# Patient Record
Sex: FEMALE | Race: WHITE | NOT HISPANIC OR LATINO | URBAN - METROPOLITAN AREA
[De-identification: names, ages, dates, MRNs, and addresses within clinical notes are randomized per-mention and may not be internally consistent; named-entity substitution may affect disease eponyms.]

---

## 2023-03-08 ENCOUNTER — TELEPHONE (OUTPATIENT)
Dept: PRIMARY CARE | Facility: CLINIC | Age: 83
End: 2023-03-08
Payer: MEDICARE

## 2023-03-09 PROBLEM — M25.572 LEFT ANKLE PAIN: Status: ACTIVE | Noted: 2023-03-09

## 2023-03-09 PROBLEM — M25.50 ARTHRALGIA: Status: ACTIVE | Noted: 2023-03-09

## 2023-03-09 PROBLEM — M11.20 PSEUDOGOUT: Status: ACTIVE | Noted: 2023-03-09

## 2023-03-09 PROBLEM — R20.2 PARESTHESIA: Status: ACTIVE | Noted: 2023-03-09

## 2023-03-09 PROBLEM — M54.2 NECK PAIN: Status: ACTIVE | Noted: 2023-03-09

## 2023-03-09 PROBLEM — K64.4 EXTERNAL HEMORRHOID: Status: ACTIVE | Noted: 2023-03-09

## 2023-03-09 PROBLEM — U07.1 COVID-19: Status: ACTIVE | Noted: 2023-03-09

## 2023-03-09 PROBLEM — G62.9 NEUROPATHY: Status: ACTIVE | Noted: 2023-03-09

## 2023-03-09 PROBLEM — R15.9 STOOL INCONTINENCE: Status: ACTIVE | Noted: 2023-03-09

## 2023-03-09 PROBLEM — R09.89 CAROTID BRUIT: Status: ACTIVE | Noted: 2023-03-09

## 2023-03-09 PROBLEM — R51.9 HEADACHE: Status: ACTIVE | Noted: 2023-03-09

## 2023-03-09 PROBLEM — R61 NIGHT SWEATS: Status: ACTIVE | Noted: 2023-03-09

## 2023-03-09 PROBLEM — M79.604 RIGHT LEG PAIN: Status: ACTIVE | Noted: 2023-03-09

## 2023-03-09 PROBLEM — F41.9 ANXIETY: Status: ACTIVE | Noted: 2023-03-09

## 2023-03-09 PROBLEM — M54.50 LOW BACK PAIN: Status: ACTIVE | Noted: 2023-03-09

## 2023-03-09 PROBLEM — M79.603 PAIN IN ARM: Status: ACTIVE | Noted: 2023-03-09
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
## 2023-03-09 PROBLEM — M89.9 DISORDER OF BONE: Status: ACTIVE | Noted: 2023-03-09

## 2023-03-09 PROBLEM — R23.3 ABNORMAL BRUISING: Status: ACTIVE | Noted: 2023-03-09

## 2023-03-09 PROBLEM — H61.20 IMPACTED CERUMEN: Status: ACTIVE | Noted: 2023-03-09

## 2023-03-09 PROBLEM — I10 HYPERTENSION: Status: ACTIVE | Noted: 2023-03-09

## 2023-03-09 PROBLEM — M25.532 LEFT WRIST PAIN: Status: ACTIVE | Noted: 2023-03-09

## 2023-03-09 PROBLEM — K21.9 GERD (GASTROESOPHAGEAL REFLUX DISEASE): Status: ACTIVE | Noted: 2023-03-09

## 2023-03-09 PROBLEM — M79.671 RIGHT FOOT PAIN: Status: ACTIVE | Noted: 2023-03-09

## 2023-03-09 PROBLEM — E55.9 VITAMIN D DEFICIENCY: Status: ACTIVE | Noted: 2023-03-09

## 2023-03-09 PROBLEM — R73.02 GLUCOSE INTOLERANCE (IMPAIRED GLUCOSE TOLERANCE): Status: ACTIVE | Noted: 2023-03-09

## 2023-03-09 PROBLEM — M25.551 RIGHT HIP PAIN: Status: ACTIVE | Noted: 2023-03-09

## 2023-03-09 PROBLEM — M65.9 SYNOVITIS: Status: ACTIVE | Noted: 2023-03-09

## 2023-03-09 PROBLEM — B07.0 PLANTAR WART OF RIGHT FOOT: Status: ACTIVE | Noted: 2023-03-09

## 2023-03-09 PROBLEM — M65.90 SYNOVITIS: Status: ACTIVE | Noted: 2023-03-09

## 2023-03-09 PROBLEM — M43.00 SPONDYLOLYSIS: Status: ACTIVE | Noted: 2023-03-09

## 2023-03-09 RX ORDER — AMLODIPINE BESYLATE 5 MG/1
1 TABLET ORAL DAILY
COMMUNITY
Start: 2022-08-02 | End: 2023-06-07

## 2023-03-09 RX ORDER — ASPIRIN 81 MG/1
1 TABLET ORAL DAILY
COMMUNITY
Start: 2013-10-15 | End: 2023-08-21 | Stop reason: ALTCHOICE

## 2023-03-09 RX ORDER — CHOLECALCIFEROL (VITAMIN D3) 125 MCG
1 CAPSULE ORAL DAILY
COMMUNITY
Start: 2015-11-16

## 2023-03-09 RX ORDER — NAPROXEN SODIUM 220 MG/1
1 TABLET ORAL DAILY
COMMUNITY
Start: 2013-10-15

## 2023-03-09 RX ORDER — CALCIUM CARBONATE/VITAMIN D3 500-10/5ML
1 LIQUID (ML) ORAL DAILY
COMMUNITY
Start: 2018-05-02

## 2023-03-09 RX ORDER — ELECTROLYTES/DEXTROSE
1 SOLUTION, ORAL ORAL DAILY
COMMUNITY
Start: 2013-10-15 | End: 2023-10-17 | Stop reason: WASHOUT

## 2023-03-09 RX ORDER — DULOXETIN HYDROCHLORIDE 60 MG/1
1 CAPSULE, DELAYED RELEASE ORAL DAILY
COMMUNITY
Start: 2016-08-24 | End: 2023-04-03

## 2023-03-10 ENCOUNTER — TELEMEDICINE (OUTPATIENT)
Dept: PRIMARY CARE | Facility: CLINIC | Age: 83
End: 2023-03-10
Payer: MEDICARE

## 2023-03-10 DIAGNOSIS — U07.1 COVID-19: Primary | ICD-10-CM

## 2023-03-10 PROCEDURE — 99212 OFFICE O/P EST SF 10 MIN: CPT | Performed by: NURSE PRACTITIONER

## 2023-03-10 RX ORDER — NIRMATRELVIR AND RITONAVIR 300-100 MG
3 KIT ORAL 2 TIMES DAILY
Qty: 30 TABLET | Refills: 0 | Status: SHIPPED | OUTPATIENT
Start: 2023-03-10 | End: 2023-03-15

## 2023-03-10 ASSESSMENT — ENCOUNTER SYMPTOMS
DIARRHEA: 0
RHINORRHEA: 0
VOMITING: 0
PALPITATIONS: 0
CHILLS: 1
ABDOMINAL DISTENTION: 0
NAUSEA: 0
CHEST TIGHTNESS: 0
CONSTIPATION: 0
COUGH: 0
FATIGUE: 1
SORE THROAT: 0
SHORTNESS OF BREATH: 0
FEVER: 0
SINUS PRESSURE: 0

## 2023-03-10 NOTE — PROGRESS NOTES
Subjective   Patient ID: Kandice Lobo is a 83 y.o. female who presents for Covid-19 Home Monitoring Visit (Positive for covid yesterday, headache, chills, sweats. Symptoms developed Tuesday. ).    HPI   83 year old female presents with positive COVID test 2 days ago. Her symptoms include headache, chills, and sweating. Symptoms started 3 days ago.   Patient reports this is her third time having COVID-- last time was 1 year ago.     She denies fever, nausea, vomiting, diarrhea, chest pain, or SOB.     Review of Systems   Constitutional:  Positive for chills and fatigue. Negative for fever.   HENT:  Negative for ear pain, postnasal drip, rhinorrhea, sinus pressure and sore throat.    Respiratory:  Negative for cough, chest tightness and shortness of breath.    Cardiovascular:  Negative for chest pain and palpitations.   Gastrointestinal:  Negative for abdominal distention, constipation, diarrhea, nausea and vomiting.       Objective   There were no vitals taken for this visit.  BSA There is no height or weight on file to calculate BSA.      Physical Exam  [facility] Encounter on 10/03/2022   Component Date Value Ref Range Status    TSH 10/03/2022 2.42  0.44 - 3.98 mIU/L Final    Comment:  TSH testing is performed using different testing    methodology at [facility] than at other    [facility] hospitals. Direct result comparisons should    only be made within the same method.      Glucose 10/03/2022 100 (H)  74 - 99 mg/dL Final    Sodium 10/03/2022 140  136 - 145 mmol/L Final    Potassium 10/03/2022 4.5  3.5 - 5.3 mmol/L Final    Chloride 10/03/2022 106  98 - 107 mmol/L Final    Bicarbonate 10/03/2022 27  21 - 32 mmol/L Final    Anion Gap 10/03/2022 12  10 - 20 mmol/L Final    Urea Nitrogen 10/03/2022 22  6 - 23 mg/dL Final    Creatinine 10/03/2022 0.75  0.50 - 1.05 mg/dL Final    GFR Female 10/03/2022 79  >90 mL/min/1.73m2 Final    Comment:  CALCULATIONS OF ESTIMATED GFR ARE PERFORMED   USING THE 2021  CKD-EPI STUDY REFIT EQUATION   WITHOUT THE RACE VARIABLE FOR THE IDMS-TRACEABLE   CREATININE METHODS.    https://jasn.asnjournals.org/content/early/2021/09/22/ASN.8056307753      Calcium 10/03/2022 9.7  8.6 - 10.6 mg/dL Final    Albumin 10/03/2022 4.5  3.4 - 5.0 g/dL Final    Alkaline Phosphatase 10/03/2022 55  33 - 136 U/L Final    Total Protein 10/03/2022 6.9  6.4 - 8.2 g/dL Final    AST 10/03/2022 15  9 - 39 U/L Final    Total Bilirubin 10/03/2022 0.8  0.0 - 1.2 mg/dL Final    ALT (SGPT) 10/03/2022 9  7 - 45 U/L Final    Comment:  Patients treated with Sulfasalazine may generate    falsely decreased results for ALT.      Vitamin D, 25-Hydroxy 10/03/2022 80  ng/mL Final    Comment: .  DEFICIENCY:         < 20   NG/ML  INSUFFICIENCY:      20-29  NG/ML  SUFFICIENCY:         NG/ML    THIS ASSAY ACCURATELY QUANTIFIES THE SUM OF  VITAMIN D3, 25-HYDROXY AND VIT D2,25-HYDROXY.      WBC 10/03/2022 9.2  4.4 - 11.3 x10E9/L Final    nRBC 10/03/2022 0.0  0.0 - 0.0 /100 WBC Final    RBC 10/03/2022 4.12  4.00 - 5.20 x10E12/L Final    Hemoglobin 10/03/2022 13.7  12.0 - 16.0 g/dL Final    Hematocrit 10/03/2022 40.7  36.0 - 46.0 % Final    MCV 10/03/2022 99  80 - 100 fL Final    MCHC 10/03/2022 33.7  32.0 - 36.0 g/dL Final    Platelets 10/03/2022 353  150 - 450 x10E9/L Final    RDW 10/03/2022 12.8  11.5 - 14.5 % Final    Neutrophils % 10/03/2022 67.7  40.0 - 80.0 % Final    Immature Granulocytes %, Automated 10/03/2022 0.5  0.0 - 0.9 % Final    Comment:  Immature Granulocyte Count (IG) includes promyelocytes,    myelocytes and metamyelocytes but does not include bands.   Percent differential counts (%) should be interpreted in the   context of the absolute cell counts (cells/L).      Lymphocytes % 10/03/2022 22.6  13.0 - 44.0 % Final    Monocytes % 10/03/2022 7.3  2.0 - 10.0 % Final    Eosinophils % 10/03/2022 1.6  0.0 - 6.0 % Final    Basophils % 10/03/2022 0.3  0.0 - 2.0 % Final    Neutrophils Absolute 10/03/2022 6.22 (H)   1.60 - 5.50 x10E9/L Final    Lymphocytes Absolute 10/03/2022 2.08  0.80 - 3.00 x10E9/L Final    Monocytes Absolute 10/03/2022 0.67  0.05 - 0.80 x10E9/L Final    Eosinophils Absolute 10/03/2022 0.15  0.00 - 0.40 x10E9/L Final    Basophils Absolute 10/03/2022 0.03  0.00 - 0.10 x10E9/L Final    Cholesterol 10/03/2022 130  0 - 199 mg/dL Final    Comment: .      AGE      DESIRABLE   BORDERLINE HIGH   HIGH     0-19 Y     0 - 169       170 - 199     >/= 200    20-24 Y     0 - 189       190 - 224     >/= 225         >24 Y     0 - 199       200 - 239     >/= 240   **All ranges are based on fasting samples. Specific   therapeutic targets will vary based on patient-specific   cardiac risk.  .   Pediatric guidelines reference:Pediatrics 2011, 128(S5).   Adult guidelines reference: NCEP ATPIII Guidelines,     [place] 2001, 258:2486-97  .   Venipuncture immediately after or during the    administration of Metamizole may lead to falsely   low results. Testing should be performed immediately   prior to Metamizole dosing.      HDL 10/03/2022 69.5  mg/dL Final    Comment: .      AGE      VERY LOW   LOW     NORMAL    HIGH       0-19 Y       < 35   < 40     40-45     ----    20-24 Y       ----   < 40       >45     ----      >24 Y       ----   < 40     40-60      >60  .      Cholesterol/HDL Ratio 10/03/2022 1.9   Final    Comment: REF VALUES  DESIRABLE  < 3.4  HIGH RISK  > 5.0      LDL 10/03/2022 45  0 - 99 mg/dL Final    Comment: .                           NEAR      BORD      AGE      DESIRABLE  OPTIMAL    HIGH     HIGH     VERY HIGH     0-19 Y     0 - 109     ---    110-129   >/= 130     ----    20-24 Y     0 - 119     ---    120-159   >/= 160     ----      >24 Y     0 -  99   100-129  130-159   160-189     >/=190  .      VLDL 10/03/2022 16  0 - 40 mg/dL Final    Triglycerides 10/03/2022 80  0 - 149 mg/dL Final    Comment: .      AGE      DESIRABLE   BORDERLINE HIGH   HIGH     VERY HIGH   0 D-90 D    19 - 174         ----         ----         ----  91 D- 9 Y     0 -  74        75 -  99     >/= 100      ----    10-19 Y     0 -  89        90 - 129     >/= 130      ----    20-24 Y     0 - 114       115 - 149     >/= 150      ----         >24 Y     0 - 149       150 - 199    200- 499    >/= 500  .   Venipuncture immediately after or during the    administration of Metamizole may lead to falsely   low results. Testing should be performed immediately   prior to Metamizole dosing.       Current Outpatient Medications on File Prior to Visit   Medication Sig Dispense Refill    amLODIPine (Norvasc) 5 mg tablet Take 1 tablet (5 mg) by mouth once daily.      aspirin 81 mg EC tablet Take 1 tablet (81 mg) by mouth once daily.      biotin 5 mg capsule Take 1 capsule (5 mg) by mouth once daily.      cholecalciferol (Vitamin D-3) 125 MCG (5000 UT) capsule Take 1 capsule (125 mcg) by mouth once daily.      DULoxetine (Cymbalta) 60 mg DR capsule Take 1 capsule (60 mg) by mouth once daily.      magnesium oxide 400 mg magnesium capsule Take 1 capsule (400 mg) by mouth once daily.      omega 3-dha-epa-fish oil 1,200 (144-216) mg capsule Take 1 capsule (1,200 mg) by mouth once daily.       No current facility-administered medications on file prior to visit.     No images are attached to the encounter.            Assessment/Plan   Problem List Items Addressed This Visit          Infectious/Inflammatory    COVID-19 - Primary     - Advised patient to quarantine for 5 days from symptom onset  - Advised patient to wear a mask for 10 days   - Paxlovid sent to pharmacy  - Patient to report to ED if develops chest pain, SOB, or fevers unrelieved by tyelnol/advil

## 2023-03-10 NOTE — ASSESSMENT & PLAN NOTE
- Advised patient to quarantine for 5 days from symptom onset  - Advised patient to wear a mask for 10 days   - Paxlovid sent to pharmacy  - Patient to report to ED if develops chest pain, SOB, or fevers unrelieved by tyelnol/advil

## 2023-06-07 DIAGNOSIS — I10 PRIMARY HYPERTENSION: ICD-10-CM

## 2023-06-07 RX ORDER — AMLODIPINE BESYLATE 5 MG/1
TABLET ORAL
Qty: 90 TABLET | Refills: 0 | Status: SHIPPED | OUTPATIENT
Start: 2023-06-07 | End: 2023-10-04

## 2023-07-05 ENCOUNTER — TELEPHONE (OUTPATIENT)
Dept: PRIMARY CARE | Facility: CLINIC | Age: 83
End: 2023-07-05
Payer: MEDICARE

## 2023-07-05 DIAGNOSIS — E55.9 VITAMIN D DEFICIENCY: ICD-10-CM

## 2023-07-05 DIAGNOSIS — I10 HYPERTENSION, UNSPECIFIED TYPE: ICD-10-CM

## 2023-07-21 ENCOUNTER — OFFICE VISIT (OUTPATIENT)
Dept: PRIMARY CARE | Facility: CLINIC | Age: 83
End: 2023-07-21
Payer: MEDICARE

## 2023-07-21 VITALS
BODY MASS INDEX: 22.67 KG/M2 | OXYGEN SATURATION: 95 % | DIASTOLIC BLOOD PRESSURE: 72 MMHG | HEART RATE: 80 BPM | SYSTOLIC BLOOD PRESSURE: 127 MMHG | WEIGHT: 118 LBS

## 2023-07-21 DIAGNOSIS — R23.3 ABNORMAL BRUISING: ICD-10-CM

## 2023-07-21 DIAGNOSIS — R53.83 OTHER FATIGUE: Primary | ICD-10-CM

## 2023-07-21 DIAGNOSIS — M25.562 ACUTE PAIN OF LEFT KNEE: ICD-10-CM

## 2023-07-21 PROBLEM — M25.551 RIGHT HIP PAIN: Status: RESOLVED | Noted: 2023-03-09 | Resolved: 2023-07-21

## 2023-07-21 PROBLEM — M25.532 LEFT WRIST PAIN: Status: RESOLVED | Noted: 2023-03-09 | Resolved: 2023-07-21

## 2023-07-21 PROBLEM — M25.572 LEFT ANKLE PAIN: Status: RESOLVED | Noted: 2023-03-09 | Resolved: 2023-07-21

## 2023-07-21 PROBLEM — M11.20 CHONDROCALCINOSIS: Status: ACTIVE | Noted: 2023-07-21

## 2023-07-21 PROBLEM — M54.2 NECK PAIN: Status: RESOLVED | Noted: 2023-03-09 | Resolved: 2023-07-21

## 2023-07-21 PROBLEM — M79.604 RIGHT LEG PAIN: Status: RESOLVED | Noted: 2023-03-09 | Resolved: 2023-07-21

## 2023-07-21 PROBLEM — M79.671 RIGHT FOOT PAIN: Status: RESOLVED | Noted: 2023-03-09 | Resolved: 2023-07-21

## 2023-07-21 PROBLEM — M54.50 LOW BACK PAIN: Status: RESOLVED | Noted: 2023-03-09 | Resolved: 2023-07-21

## 2023-07-21 PROBLEM — H61.20 IMPACTED CERUMEN: Status: RESOLVED | Noted: 2023-03-09 | Resolved: 2023-07-21

## 2023-07-21 PROBLEM — R76.8 POSITIVE ANTINUCLEAR ANTIBODY: Status: ACTIVE | Noted: 2023-07-21

## 2023-07-21 PROBLEM — M16.10 PRIMARY LOCALIZED OSTEOARTHRITIS OF PELVIC REGION AND THIGH: Status: ACTIVE | Noted: 2023-07-21

## 2023-07-21 PROBLEM — M17.0 ARTHRITIS OF BOTH KNEES: Status: ACTIVE | Noted: 2023-07-21

## 2023-07-21 PROCEDURE — 3074F SYST BP LT 130 MM HG: CPT | Performed by: NURSE PRACTITIONER

## 2023-07-21 PROCEDURE — 99213 OFFICE O/P EST LOW 20 MIN: CPT | Performed by: NURSE PRACTITIONER

## 2023-07-21 PROCEDURE — 3078F DIAST BP <80 MM HG: CPT | Performed by: NURSE PRACTITIONER

## 2023-07-21 PROCEDURE — 1036F TOBACCO NON-USER: CPT | Performed by: NURSE PRACTITIONER

## 2023-07-21 PROCEDURE — 1159F MED LIST DOCD IN RCRD: CPT | Performed by: NURSE PRACTITIONER

## 2023-07-21 PROCEDURE — 1160F RVW MEDS BY RX/DR IN RCRD: CPT | Performed by: NURSE PRACTITIONER

## 2023-07-21 ASSESSMENT — ENCOUNTER SYMPTOMS
FEVER: 0
CHILLS: 0
ABDOMINAL PAIN: 0
NAUSEA: 0
DYSURIA: 0
BRUISES/BLEEDS EASILY: 1
VOMITING: 0
HEMATURIA: 0
COUGH: 0
SHORTNESS OF BREATH: 0
DIARRHEA: 0

## 2023-07-21 NOTE — ASSESSMENT & PLAN NOTE
Follows with ortho at [facility] Dr. Mynor Wlaton  Recommend patient schedule follow up apt  According to patient last steroid injection was 3 months ago.,

## 2023-07-21 NOTE — PROGRESS NOTES
Subjective   Chief Complaint: No energy and Bleeding/Bruising (Bruising easily).    HPI   Kandice Lobo is a 83 y.o. female who presents for No energy and Bleeding/Bruising (Bruising easily).    Patient presents with multiple complaints.    She reports that she has been noticed increased bruising lately. She has a history of frequent bruising but feels it has become more lately.   She reports feeling less energy overall as well lately.   She does take a daily aspirin. She reports she takes this due to her age.   Does not have a history of blood clots or CVA.     She also reports left knee and leg pain, She follows with Dr. Mynor bagley at [facility] for letf knee bakers cysts and had a steroid injection 3 months ago.   Patient reports her pain has been keeping her up at night (which may be the cause of her increased fatigue). She does not have a follow up apt scheduled with her orthopedic specialist and was advised to schedule.      Patient denies fever, chills, nausea, vomiting, diarrhea, chest pain, heart palpations, or shortness of breath.         Review of Systems   Constitutional:  Negative for chills and fever.   Respiratory:  Negative for cough and shortness of breath.    Cardiovascular:  Negative for chest pain.   Gastrointestinal:  Negative for abdominal pain, diarrhea, nausea and vomiting.   Genitourinary:  Negative for decreased urine volume, dysuria and hematuria.   Musculoskeletal:         Left knee and leg pain   No swelling  No warmth, no redness    Hematological:  Bruises/bleeds easily.       Objective   /72   Pulse 80   Wt 53.5 kg (118 lb)   SpO2 95%   BMI 22.67 kg/m²   BSA Body surface area is 1.51 meters squared.      Physical Exam  Constitutional:       Appearance: Normal appearance.   Cardiovascular:      Rate and Rhythm: Normal rate and regular rhythm.   Pulmonary:      Effort: Pulmonary effort is normal.      Breath sounds: Normal breath sounds.   Abdominal:      General:  Abdomen is flat.      Palpations: Abdomen is soft.   Skin:     Findings: Bruising present.   Neurological:      Mental Status: She is alert.       [facility] Encounter on 10/03/2022   Component Date Value Ref Range Status    TSH 10/03/2022 2.42  0.44 - 3.98 mIU/L Final    Comment:  TSH testing is performed using different testing    methodology at [facility] than at other    [facility]. Direct result comparisons should    only be made within the same method.      Glucose 10/03/2022 100 (H)  74 - 99 mg/dL Final    Sodium 10/03/2022 140  136 - 145 mmol/L Final    Potassium 10/03/2022 4.5  3.5 - 5.3 mmol/L Final    Chloride 10/03/2022 106  98 - 107 mmol/L Final    Bicarbonate 10/03/2022 27  21 - 32 mmol/L Final    Anion Gap 10/03/2022 12  10 - 20 mmol/L Final    Urea Nitrogen 10/03/2022 22  6 - 23 mg/dL Final    Creatinine 10/03/2022 0.75  0.50 - 1.05 mg/dL Final    GFR Female 10/03/2022 79  >90 mL/min/1.73m2 Final    Comment:  CALCULATIONS OF ESTIMATED GFR ARE PERFORMED   USING THE 2021 CKD-EPI STUDY REFIT EQUATION   WITHOUT THE RACE VARIABLE FOR THE IDMS-TRACEABLE   CREATININE METHODS.    https://jasn.asnjournals.org/content/early/2021/09/22/ASN.1267155627      Calcium 10/03/2022 9.7  8.6 - 10.6 mg/dL Final    Albumin 10/03/2022 4.5  3.4 - 5.0 g/dL Final    Alkaline Phosphatase 10/03/2022 55  33 - 136 U/L Final    Total Protein 10/03/2022 6.9  6.4 - 8.2 g/dL Final    AST 10/03/2022 15  9 - 39 U/L Final    Total Bilirubin 10/03/2022 0.8  0.0 - 1.2 mg/dL Final    ALT (SGPT) 10/03/2022 9  7 - 45 U/L Final    Comment:  Patients treated with Sulfasalazine may generate    falsely decreased results for ALT.      Vitamin D, 25-Hydroxy 10/03/2022 80  ng/mL Final    Comment: .  DEFICIENCY:         < 20   NG/ML  INSUFFICIENCY:      20-29  NG/ML  SUFFICIENCY:         NG/ML    THIS ASSAY ACCURATELY QUANTIFIES THE SUM OF  VITAMIN D3, 25-HYDROXY AND VIT D2,25-HYDROXY.      WBC 10/03/2022 9.2  4.4 - 11.3  x10E9/L Final    nRBC 10/03/2022 0.0  0.0 - 0.0 /100 WBC Final    RBC 10/03/2022 4.12  4.00 - 5.20 x10E12/L Final    Hemoglobin 10/03/2022 13.7  12.0 - 16.0 g/dL Final    Hematocrit 10/03/2022 40.7  36.0 - 46.0 % Final    MCV 10/03/2022 99  80 - 100 fL Final    MCHC 10/03/2022 33.7  32.0 - 36.0 g/dL Final    Platelets 10/03/2022 353  150 - 450 x10E9/L Final    RDW 10/03/2022 12.8  11.5 - 14.5 % Final    Neutrophils % 10/03/2022 67.7  40.0 - 80.0 % Final    Immature Granulocytes %, Automated 10/03/2022 0.5  0.0 - 0.9 % Final    Comment:  Immature Granulocyte Count (IG) includes promyelocytes,    myelocytes and metamyelocytes but does not include bands.   Percent differential counts (%) should be interpreted in the   context of the absolute cell counts (cells/L).      Lymphocytes % 10/03/2022 22.6  13.0 - 44.0 % Final    Monocytes % 10/03/2022 7.3  2.0 - 10.0 % Final    Eosinophils % 10/03/2022 1.6  0.0 - 6.0 % Final    Basophils % 10/03/2022 0.3  0.0 - 2.0 % Final    Neutrophils Absolute 10/03/2022 6.22 (H)  1.60 - 5.50 x10E9/L Final    Lymphocytes Absolute 10/03/2022 2.08  0.80 - 3.00 x10E9/L Final    Monocytes Absolute 10/03/2022 0.67  0.05 - 0.80 x10E9/L Final    Eosinophils Absolute 10/03/2022 0.15  0.00 - 0.40 x10E9/L Final    Basophils Absolute 10/03/2022 0.03  0.00 - 0.10 x10E9/L Final    Cholesterol 10/03/2022 130  0 - 199 mg/dL Final    Comment: .      AGE      DESIRABLE   BORDERLINE HIGH   HIGH     0-19 Y     0 - 169       170 - 199     >/= 200    20-24 Y     0 - 189       190 - 224     >/= 225         >24 Y     0 - 199       200 - 239     >/= 240   **All ranges are based on fasting samples. Specific   therapeutic targets will vary based on patient-specific   cardiac risk.  .   Pediatric guidelines reference:Pediatrics 2011, 128(S5).   Adult guidelines reference: NCEP ATPIII Guidelines,     [place] 2001, 258:2486-97  .   Venipuncture immediately after or during the    administration of Metamizole may lead to  falsely   low results. Testing should be performed immediately   prior to Metamizole dosing.      HDL 10/03/2022 69.5  mg/dL Final    Comment: .      AGE      VERY LOW   LOW     NORMAL    HIGH       0-19 Y       < 35   < 40     40-45     ----    20-24 Y       ----   < 40       >45     ----      >24 Y       ----   < 40     40-60      >60  .      Cholesterol/HDL Ratio 10/03/2022 1.9   Final    Comment: REF VALUES  DESIRABLE  < 3.4  HIGH RISK  > 5.0      LDL 10/03/2022 45  0 - 99 mg/dL Final    Comment: .                           NEAR      BORD      AGE      DESIRABLE  OPTIMAL    HIGH     HIGH     VERY HIGH     0-19 Y     0 - 109     ---    110-129   >/= 130     ----    20-24 Y     0 - 119     ---    120-159   >/= 160     ----      >24 Y     0 -  99   100-129  130-159   160-189     >/=190  .      VLDL 10/03/2022 16  0 - 40 mg/dL Final    Triglycerides 10/03/2022 80  0 - 149 mg/dL Final    Comment: .      AGE      DESIRABLE   BORDERLINE HIGH   HIGH     VERY HIGH   0 D-90 D    19 - 174         ----         ----        ----  91 D- 9 Y     0 -  74        75 -  99     >/= 100      ----    10-19 Y     0 -  89        90 - 129     >/= 130      ----    20-24 Y     0 - 114       115 - 149     >/= 150      ----         >24 Y     0 - 149       150 - 199    200- 499    >/= 500  .   Venipuncture immediately after or during the    administration of Metamizole may lead to falsely   low results. Testing should be performed immediately   prior to Metamizole dosing.       Current Outpatient Medications on File Prior to Visit   Medication Sig Dispense Refill    amLODIPine (Norvasc) 5 mg tablet TAKE ONE TABLET BY MOUTH ONCE DAILY. 90 tablet 0    aspirin 81 mg EC tablet Take 1 tablet (81 mg) by mouth once daily.      cholecalciferol (Vitamin D-3) 125 MCG (5000 UT) capsule Take 1 capsule (125 mcg) by mouth once daily.      DULoxetine (Cymbalta) 60 mg DR capsule TAKE ONE CAPSULE BY MOUTH ONCE DAILY. 90 capsule 0    magnesium oxide 400 mg  magnesium capsule Take 1 capsule (400 mg) by mouth once daily.      omega 3-dha-epa-fish oil 1,200 (144-216) mg capsule Take 1 capsule (1,200 mg) by mouth once daily.      biotin 5 mg capsule Take 1 capsule (5 mg) by mouth once daily.       No current facility-administered medications on file prior to visit.     No images are attached to the encounter.            Assessment/Plan   Problem List Items Addressed This Visit       Other fatigue - Primary     BW ordered           Relevant Orders    Vitamin B12    Acute pain of left knee     Follows with ortho at [facility] Dr. Mynor Walton  Recommend patient schedule follow up apt  According to patient last steroid injection was 3 months ago.,          Abnormal bruising     BW ordered

## 2023-07-27 ENCOUNTER — LAB (OUTPATIENT)
Dept: LAB | Facility: LAB | Age: 83
End: 2023-07-27
Payer: MEDICARE

## 2023-07-27 DIAGNOSIS — E55.9 VITAMIN D DEFICIENCY: ICD-10-CM

## 2023-07-27 DIAGNOSIS — R53.83 OTHER FATIGUE: ICD-10-CM

## 2023-07-27 DIAGNOSIS — I10 HYPERTENSION, UNSPECIFIED TYPE: ICD-10-CM

## 2023-07-27 LAB
BASOPHILS (10*3/UL) IN BLOOD BY AUTOMATED COUNT: 0.02 X10E9/L (ref 0–0.1)
BASOPHILS/100 LEUKOCYTES IN BLOOD BY AUTOMATED COUNT: 0.4 % (ref 0–2)
EOSINOPHILS (10*3/UL) IN BLOOD BY AUTOMATED COUNT: 0.09 X10E9/L (ref 0–0.4)
EOSINOPHILS/100 LEUKOCYTES IN BLOOD BY AUTOMATED COUNT: 1.6 % (ref 0–6)
ERYTHROCYTE DISTRIBUTION WIDTH (RATIO) BY AUTOMATED COUNT: 12.1 % (ref 11.5–14.5)
ERYTHROCYTE MEAN CORPUSCULAR HEMOGLOBIN CONCENTRATION (G/DL) BY AUTOMATED: 32.5 G/DL (ref 32–36)
ERYTHROCYTE MEAN CORPUSCULAR VOLUME (FL) BY AUTOMATED COUNT: 102 FL (ref 80–100)
ERYTHROCYTES (10*6/UL) IN BLOOD BY AUTOMATED COUNT: 4.1 X10E12/L (ref 4–5.2)
HEMATOCRIT (%) IN BLOOD BY AUTOMATED COUNT: 41.9 % (ref 36–46)
HEMOGLOBIN (G/DL) IN BLOOD: 13.6 G/DL (ref 12–16)
IMMATURE GRANULOCYTES/100 LEUKOCYTES IN BLOOD BY AUTOMATED COUNT: 0.2 % (ref 0–0.9)
LEUKOCYTES (10*3/UL) IN BLOOD BY AUTOMATED COUNT: 5.7 X10E9/L (ref 4.4–11.3)
LYMPHOCYTES (10*3/UL) IN BLOOD BY AUTOMATED COUNT: 1.73 X10E9/L (ref 0.8–3)
LYMPHOCYTES/100 LEUKOCYTES IN BLOOD BY AUTOMATED COUNT: 30.5 % (ref 13–44)
MONOCYTES (10*3/UL) IN BLOOD BY AUTOMATED COUNT: 0.58 X10E9/L (ref 0.05–0.8)
MONOCYTES/100 LEUKOCYTES IN BLOOD BY AUTOMATED COUNT: 10.2 % (ref 2–10)
NEUTROPHILS (10*3/UL) IN BLOOD BY AUTOMATED COUNT: 3.25 X10E9/L (ref 1.6–5.5)
NEUTROPHILS/100 LEUKOCYTES IN BLOOD BY AUTOMATED COUNT: 57.1 % (ref 40–80)
NRBC (PER 100 WBCS) BY AUTOMATED COUNT: 0 /100 WBC (ref 0–0)
PLATELETS (10*3/UL) IN BLOOD AUTOMATED COUNT: 335 X10E9/L (ref 150–450)

## 2023-07-27 PROCEDURE — 82306 VITAMIN D 25 HYDROXY: CPT

## 2023-07-27 PROCEDURE — 83036 HEMOGLOBIN GLYCOSYLATED A1C: CPT

## 2023-07-27 PROCEDURE — 85025 COMPLETE CBC W/AUTO DIFF WBC: CPT

## 2023-07-27 PROCEDURE — 84443 ASSAY THYROID STIM HORMONE: CPT

## 2023-07-27 PROCEDURE — 80061 LIPID PANEL: CPT

## 2023-07-27 PROCEDURE — 80053 COMPREHEN METABOLIC PANEL: CPT

## 2023-07-27 PROCEDURE — 36415 COLL VENOUS BLD VENIPUNCTURE: CPT

## 2023-07-27 PROCEDURE — 82607 VITAMIN B-12: CPT

## 2023-07-28 LAB
ALANINE AMINOTRANSFERASE (SGPT) (U/L) IN SER/PLAS: 6 U/L (ref 7–45)
ALBUMIN (G/DL) IN SER/PLAS: 4.7 G/DL (ref 3.4–5)
ALKALINE PHOSPHATASE (U/L) IN SER/PLAS: 48 U/L (ref 33–136)
ANION GAP IN SER/PLAS: 17 MMOL/L (ref 10–20)
ASPARTATE AMINOTRANSFERASE (SGOT) (U/L) IN SER/PLAS: 14 U/L (ref 9–39)
BILIRUBIN TOTAL (MG/DL) IN SER/PLAS: 0.5 MG/DL (ref 0–1.2)
CALCIDIOL (25 OH VITAMIN D3) (NG/ML) IN SER/PLAS: 87 NG/ML
CALCIUM (MG/DL) IN SER/PLAS: 9.8 MG/DL (ref 8.6–10.6)
CARBON DIOXIDE, TOTAL (MMOL/L) IN SER/PLAS: 22 MMOL/L (ref 21–32)
CHLORIDE (MMOL/L) IN SER/PLAS: 106 MMOL/L (ref 98–107)
CHOLESTEROL (MG/DL) IN SER/PLAS: 169 MG/DL (ref 0–199)
CHOLESTEROL IN HDL (MG/DL) IN SER/PLAS: 60.8 MG/DL
CHOLESTEROL/HDL RATIO: 2.8
COBALAMIN (VITAMIN B12) (PG/ML) IN SER/PLAS: 226 PG/ML (ref 211–911)
CREATININE (MG/DL) IN SER/PLAS: 0.95 MG/DL (ref 0.5–1.05)
ESTIMATED AVERAGE GLUCOSE FOR HBA1C: 123 MG/DL
GFR FEMALE: 59 ML/MIN/1.73M2
GLUCOSE (MG/DL) IN SER/PLAS: 132 MG/DL (ref 74–99)
HEMOGLOBIN A1C/HEMOGLOBIN TOTAL IN BLOOD: 5.9 %
LDL: 94 MG/DL (ref 0–99)
POTASSIUM (MMOL/L) IN SER/PLAS: 4.3 MMOL/L (ref 3.5–5.3)
PROTEIN TOTAL: 7.1 G/DL (ref 6.4–8.2)
SODIUM (MMOL/L) IN SER/PLAS: 141 MMOL/L (ref 136–145)
THYROTROPIN (MIU/L) IN SER/PLAS BY DETECTION LIMIT <= 0.05 MIU/L: 2.41 MIU/L (ref 0.44–3.98)
TRIGLYCERIDE (MG/DL) IN SER/PLAS: 69 MG/DL (ref 0–149)
UREA NITROGEN (MG/DL) IN SER/PLAS: 35 MG/DL (ref 6–23)
VLDL: 14 MG/DL (ref 0–40)

## 2023-08-06 DIAGNOSIS — G62.9 NEUROPATHY: ICD-10-CM

## 2023-08-07 RX ORDER — DULOXETIN HYDROCHLORIDE 60 MG/1
CAPSULE, DELAYED RELEASE ORAL
Qty: 90 CAPSULE | Refills: 0 | Status: SHIPPED | OUTPATIENT
Start: 2023-08-07 | End: 2023-12-02

## 2023-08-18 ENCOUNTER — TELEPHONE (OUTPATIENT)
Dept: PRIMARY CARE | Facility: CLINIC | Age: 83
End: 2023-08-18
Payer: MEDICARE

## 2023-08-21 ENCOUNTER — LAB (OUTPATIENT)
Dept: LAB | Facility: LAB | Age: 83
End: 2023-08-21
Payer: MEDICARE

## 2023-08-21 ENCOUNTER — OFFICE VISIT (OUTPATIENT)
Dept: PRIMARY CARE | Facility: CLINIC | Age: 83
End: 2023-08-21
Payer: MEDICARE

## 2023-08-21 VITALS
SYSTOLIC BLOOD PRESSURE: 122 MMHG | DIASTOLIC BLOOD PRESSURE: 60 MMHG | BODY MASS INDEX: 22.63 KG/M2 | HEART RATE: 83 BPM | WEIGHT: 117.8 LBS

## 2023-08-21 DIAGNOSIS — R23.3 ABNORMAL BRUISING: ICD-10-CM

## 2023-08-21 DIAGNOSIS — R79.89 LOW VITAMIN B12 LEVEL: ICD-10-CM

## 2023-08-21 DIAGNOSIS — R23.3 ABNORMAL BRUISING: Primary | ICD-10-CM

## 2023-08-21 DIAGNOSIS — R53.83 OTHER FATIGUE: ICD-10-CM

## 2023-08-21 PROBLEM — R76.0 RAISED ANTIBODY TITER: Status: ACTIVE | Noted: 2023-08-21

## 2023-08-21 PROBLEM — M16.0 OSTEOARTHRITIS OF BOTH HIPS: Status: ACTIVE | Noted: 2023-08-21

## 2023-08-21 PROCEDURE — 1159F MED LIST DOCD IN RCRD: CPT | Performed by: FAMILY MEDICINE

## 2023-08-21 PROCEDURE — 1160F RVW MEDS BY RX/DR IN RCRD: CPT | Performed by: FAMILY MEDICINE

## 2023-08-21 PROCEDURE — 36415 COLL VENOUS BLD VENIPUNCTURE: CPT

## 2023-08-21 PROCEDURE — 3074F SYST BP LT 130 MM HG: CPT | Performed by: FAMILY MEDICINE

## 2023-08-21 PROCEDURE — 3078F DIAST BP <80 MM HG: CPT | Performed by: FAMILY MEDICINE

## 2023-08-21 PROCEDURE — 83921 ORGANIC ACID SINGLE QUANT: CPT

## 2023-08-21 PROCEDURE — 1036F TOBACCO NON-USER: CPT | Performed by: FAMILY MEDICINE

## 2023-08-21 PROCEDURE — 82746 ASSAY OF FOLIC ACID SERUM: CPT

## 2023-08-21 PROCEDURE — 99214 OFFICE O/P EST MOD 30 MIN: CPT | Performed by: FAMILY MEDICINE

## 2023-08-21 ASSESSMENT — ENCOUNTER SYMPTOMS
FEVER: 0
DIARRHEA: 0
DYSURIA: 0
ABDOMINAL PAIN: 0
VOMITING: 0
NAUSEA: 0
BRUISES/BLEEDS EASILY: 1
COUGH: 0
HEMATURIA: 0
CHILLS: 0
SHORTNESS OF BREATH: 0

## 2023-08-21 NOTE — PROGRESS NOTES
Subjective   Patient ID: Kandice Lobo is a 83 y.o. female who presents for To discuss recent labs. .    HPI   Comes in to follow-up on recent labs she had performed because of frequent bruising and fatigue.  He has been following up with her orthopedist regarding her leg pain.  Review of Systems   Constitutional:  Negative for chills and fever.   Respiratory:  Negative for cough and shortness of breath.    Cardiovascular:  Negative for chest pain.   Gastrointestinal:  Negative for abdominal pain, diarrhea, nausea and vomiting.   Genitourinary:  Negative for decreased urine volume, dysuria and hematuria.   Musculoskeletal:         Left knee and leg pain   No swelling  No warmth, no redness    Hematological:  Bruises/bleeds easily.       Objective   /60 (BP Location: Left arm)   Pulse 83   Wt 53.4 kg (117 lb 12.8 oz)   BMI 22.63 kg/m²   BSA Body surface area is 1.51 meters squared.      Physical Exam  Constitutional:       Appearance: Normal appearance.   Cardiovascular:      Rate and Rhythm: Normal rate and regular rhythm.   Pulmonary:      Effort: Pulmonary effort is normal.      Breath sounds: Normal breath sounds.   Abdominal:      General: Abdomen is flat.      Palpations: Abdomen is soft.   Skin:     Findings: Bruising present.   Neurological:      Mental Status: She is alert.       Lab on 07/27/2023   Component Date Value Ref Range Status    WBC 07/27/2023 5.7  4.4 - 11.3 x10E9/L Final    nRBC 07/27/2023 0.0  0.0 - 0.0 /100 WBC Final    RBC 07/27/2023 4.10  4.00 - 5.20 x10E12/L Final    Hemoglobin 07/27/2023 13.6  12.0 - 16.0 g/dL Final    Hematocrit 07/27/2023 41.9  36.0 - 46.0 % Final    MCV 07/27/2023 102 (H)  80 - 100 fL Final    MCHC 07/27/2023 32.5  32.0 - 36.0 g/dL Final    Platelets 07/27/2023 335  150 - 450 x10E9/L Final    RDW 07/27/2023 12.1  11.5 - 14.5 % Final    Neutrophils % 07/27/2023 57.1  40.0 - 80.0 % Final    Immature Granulocytes %, Automated 07/27/2023 0.2  0.0 - 0.9 % Final      Immature Granulocyte Count (IG) includes promyelocytes,    myelocytes and metamyelocytes but does not include bands.   Percent differential counts (%) should be interpreted in the   context of the absolute cell counts (cells/L).    Lymphocytes % 07/27/2023 30.5  13.0 - 44.0 % Final    Monocytes % 07/27/2023 10.2  2.0 - 10.0 % Final    Eosinophils % 07/27/2023 1.6  0.0 - 6.0 % Final    Basophils % 07/27/2023 0.4  0.0 - 2.0 % Final    Neutrophils Absolute 07/27/2023 3.25  1.60 - 5.50 x10E9/L Final    Lymphocytes Absolute 07/27/2023 1.73  0.80 - 3.00 x10E9/L Final    Monocytes Absolute 07/27/2023 0.58  0.05 - 0.80 x10E9/L Final    Eosinophils Absolute 07/27/2023 0.09  0.00 - 0.40 x10E9/L Final    Basophils Absolute 07/27/2023 0.02  0.00 - 0.10 x10E9/L Final    Glucose 07/27/2023 132 (H)  74 - 99 mg/dL Final    Sodium 07/27/2023 141  136 - 145 mmol/L Final    Potassium 07/27/2023 4.3  3.5 - 5.3 mmol/L Final    Chloride 07/27/2023 106  98 - 107 mmol/L Final    Bicarbonate 07/27/2023 22  21 - 32 mmol/L Final    Anion Gap 07/27/2023 17  10 - 20 mmol/L Final    Urea Nitrogen 07/27/2023 35 (H)  6 - 23 mg/dL Final    Creatinine 07/27/2023 0.95  0.50 - 1.05 mg/dL Final    GFR Female 07/27/2023 59 (A)  >90 mL/min/1.73m2 Final     CALCULATIONS OF ESTIMATED GFR ARE PERFORMED   USING THE 2021 CKD-EPI STUDY REFIT EQUATION   WITHOUT THE RACE VARIABLE FOR THE IDMS-TRACEABLE   CREATININE METHODS.    https://jasn.asnjournals.org/content/early/2021/09/22/ASN.1246266185    Calcium 07/27/2023 9.8  8.6 - 10.6 mg/dL Final    Albumin 07/27/2023 4.7  3.4 - 5.0 g/dL Final    Alkaline Phosphatase 07/27/2023 48  33 - 136 U/L Final    Total Protein 07/27/2023 7.1  6.4 - 8.2 g/dL Final    AST 07/27/2023 14  9 - 39 U/L Final    Total Bilirubin 07/27/2023 0.5  0.0 - 1.2 mg/dL Final    ALT (SGPT) 07/27/2023 6 (L)  7 - 45 U/L Final     Patients treated with Sulfasalazine may generate    falsely decreased results for ALT.    Cholesterol 07/27/2023  169  0 - 199 mg/dL Final    .      AGE      DESIRABLE   BORDERLINE HIGH   HIGH     0-19 Y     0 - 169       170 - 199     >/= 200    20-24 Y     0 - 189       190 - 224     >/= 225         >24 Y     0 - 199       200 - 239     >/= 240   **All ranges are based on fasting samples. Specific   therapeutic targets will vary based on patient-specific   cardiac risk.  .   Pediatric guidelines reference:Pediatrics 2011, 128(S5).   Adult guidelines reference: NCEP ATPIII Guidelines,     [place] 2001, 258:2486-97  .   Venipuncture immediately after or during the    administration of Metamizole may lead to falsely   low results. Testing should be performed immediately   prior to Metamizole dosing.    HDL 07/27/2023 60.8  mg/dL Final    .      AGE      VERY LOW   LOW     NORMAL    HIGH       0-19 Y       < 35   < 40     40-45     ----    20-24 Y       ----   < 40       >45     ----      >24 Y       ----   < 40     40-60      >60  .    Cholesterol/HDL Ratio 07/27/2023 2.8   Final    REF VALUES  DESIRABLE  < 3.4  HIGH RISK  > 5.0    LDL 07/27/2023 94  0 - 99 mg/dL Final    .                           NEAR      BORD      AGE      DESIRABLE  OPTIMAL    HIGH     HIGH     VERY HIGH     0-19 Y     0 - 109     ---    110-129   >/= 130     ----    20-24 Y     0 - 119     ---    120-159   >/= 160     ----      >24 Y     0 -  99   100-129  130-159   160-189     >/=190  .    VLDL 07/27/2023 14  0 - 40 mg/dL Final    Triglycerides 07/27/2023 69  0 - 149 mg/dL Final    .      AGE      DESIRABLE   BORDERLINE HIGH   HIGH     VERY HIGH   0 D-90 D    19 - 174         ----         ----        ----  91 D- 9 Y     0 -  74        75 -  99     >/= 100      ----    10-19 Y     0 -  89        90 - 129     >/= 130      ----    20-24 Y     0 - 114       115 - 149     >/= 150      ----         >24 Y     0 - 149       150 - 199    200- 499    >/= 500  .   Venipuncture immediately after or during the    administration of Metamizole may lead to falsely   low  results. Testing should be performed immediately   prior to Metamizole dosing.    TSH 07/27/2023 2.41  0.44 - 3.98 mIU/L Final     TSH testing is performed using different testing    methodology at [facility] than at other    system [facility]. Direct result comparisons should    only be made within the same method.    Vitamin D, 25-Hydroxy 07/27/2023 87  ng/mL Final    .  DEFICIENCY:         < 20   NG/ML  INSUFFICIENCY:      20-29  NG/ML  SUFFICIENCY:         NG/ML    THIS ASSAY ACCURATELY QUANTIFIES THE SUM OF  VITAMIN D3, 25-HYDROXY AND VIT D2,25-HYDROXY.    Vitamin B-12 07/27/2023 226  211 - 911 pg/mL Final    Hemoglobin A1C 07/27/2023 5.9 (A)  % Final    Comment:      Diagnosis of Diabetes-Adults   Non-Diabetic: < or = 5.6%   Increased risk for developing diabetes: 5.7-6.4%   Diagnostic of diabetes: > or = 6.5%  .       Monitoring of Diabetes                Age (y)     Therapeutic Goal (%)   Adults:          >18           <7.0   Pediatrics:    13-18           <7.5                   7-12           <8.0                   0- 6            7.5-8.5   American Diabetes Association. Diabetes Care 33(S1), Jan 2010.      Estimated Average Glucose 07/27/2023 123  MG/DL Final   [facility] Encounter on 10/03/2022   Component Date Value Ref Range Status    TSH 10/03/2022 2.42  0.44 - 3.98 mIU/L Final    Comment:  TSH testing is performed using different testing    methodology at [facility] than at other    [facility]. Direct result comparisons should    only be made within the same method.      Glucose 10/03/2022 100 (H)  74 - 99 mg/dL Final    Sodium 10/03/2022 140  136 - 145 mmol/L Final    Potassium 10/03/2022 4.5  3.5 - 5.3 mmol/L Final    Chloride 10/03/2022 106  98 - 107 mmol/L Final    Bicarbonate 10/03/2022 27  21 - 32 mmol/L Final    Anion Gap 10/03/2022 12  10 - 20 mmol/L Final    Urea Nitrogen 10/03/2022 22  6 - 23 mg/dL Final    Creatinine 10/03/2022 0.75  0.50 - 1.05 mg/dL  Final    GFR Female 10/03/2022 79  >90 mL/min/1.73m2 Final    Comment:  CALCULATIONS OF ESTIMATED GFR ARE PERFORMED   USING THE 2021 CKD-EPI STUDY REFIT EQUATION   WITHOUT THE RACE VARIABLE FOR THE IDMS-TRACEABLE   CREATININE METHODS.    https://jasn.asnjournals.org/content/early/2021/09/22/ASN.2929545815      Calcium 10/03/2022 9.7  8.6 - 10.6 mg/dL Final    Albumin 10/03/2022 4.5  3.4 - 5.0 g/dL Final    Alkaline Phosphatase 10/03/2022 55  33 - 136 U/L Final    Total Protein 10/03/2022 6.9  6.4 - 8.2 g/dL Final    AST 10/03/2022 15  9 - 39 U/L Final    Total Bilirubin 10/03/2022 0.8  0.0 - 1.2 mg/dL Final    ALT (SGPT) 10/03/2022 9  7 - 45 U/L Final    Comment:  Patients treated with Sulfasalazine may generate    falsely decreased results for ALT.      Vitamin D, 25-Hydroxy 10/03/2022 80  ng/mL Final    Comment: .  DEFICIENCY:         < 20   NG/ML  INSUFFICIENCY:      20-29  NG/ML  SUFFICIENCY:         NG/ML    THIS ASSAY ACCURATELY QUANTIFIES THE SUM OF  VITAMIN D3, 25-HYDROXY AND VIT D2,25-HYDROXY.      WBC 10/03/2022 9.2  4.4 - 11.3 x10E9/L Final    nRBC 10/03/2022 0.0  0.0 - 0.0 /100 WBC Final    RBC 10/03/2022 4.12  4.00 - 5.20 x10E12/L Final    Hemoglobin 10/03/2022 13.7  12.0 - 16.0 g/dL Final    Hematocrit 10/03/2022 40.7  36.0 - 46.0 % Final    MCV 10/03/2022 99  80 - 100 fL Final    MCHC 10/03/2022 33.7  32.0 - 36.0 g/dL Final    Platelets 10/03/2022 353  150 - 450 x10E9/L Final    RDW 10/03/2022 12.8  11.5 - 14.5 % Final    Neutrophils % 10/03/2022 67.7  40.0 - 80.0 % Final    Immature Granulocytes %, Automated 10/03/2022 0.5  0.0 - 0.9 % Final    Comment:  Immature Granulocyte Count (IG) includes promyelocytes,    myelocytes and metamyelocytes but does not include bands.   Percent differential counts (%) should be interpreted in the   context of the absolute cell counts (cells/L).      Lymphocytes % 10/03/2022 22.6  13.0 - 44.0 % Final    Monocytes % 10/03/2022 7.3  2.0 - 10.0 % Final     Eosinophils % 10/03/2022 1.6  0.0 - 6.0 % Final    Basophils % 10/03/2022 0.3  0.0 - 2.0 % Final    Neutrophils Absolute 10/03/2022 6.22 (H)  1.60 - 5.50 x10E9/L Final    Lymphocytes Absolute 10/03/2022 2.08  0.80 - 3.00 x10E9/L Final    Monocytes Absolute 10/03/2022 0.67  0.05 - 0.80 x10E9/L Final    Eosinophils Absolute 10/03/2022 0.15  0.00 - 0.40 x10E9/L Final    Basophils Absolute 10/03/2022 0.03  0.00 - 0.10 x10E9/L Final    Cholesterol 10/03/2022 130  0 - 199 mg/dL Final    Comment: .      AGE      DESIRABLE   BORDERLINE HIGH   HIGH     0-19 Y     0 - 169       170 - 199     >/= 200    20-24 Y     0 - 189       190 - 224     >/= 225         >24 Y     0 - 199       200 - 239     >/= 240   **All ranges are based on fasting samples. Specific   therapeutic targets will vary based on patient-specific   cardiac risk.  .   Pediatric guidelines reference:Pediatrics 2011, 128(S5).   Adult guidelines reference: NCEP ATPIII Guidelines,     [place] 2001, 258:2486-97  .   Venipuncture immediately after or during the    administration of Metamizole may lead to falsely   low results. Testing should be performed immediately   prior to Metamizole dosing.      HDL 10/03/2022 69.5  mg/dL Final    Comment: .      AGE      VERY LOW   LOW     NORMAL    HIGH       0-19 Y       < 35   < 40     40-45     ----    20-24 Y       ----   < 40       >45     ----      >24 Y       ----   < 40     40-60      >60  .      Cholesterol/HDL Ratio 10/03/2022 1.9   Final    Comment: REF VALUES  DESIRABLE  < 3.4  HIGH RISK  > 5.0      LDL 10/03/2022 45  0 - 99 mg/dL Final    Comment: .                           NEAR      BORD      AGE      DESIRABLE  OPTIMAL    HIGH     HIGH     VERY HIGH     0-19 Y     0 - 109     ---    110-129   >/= 130     ----    20-24 Y     0 - 119     ---    120-159   >/= 160     ----      >24 Y     0 -  99   100-129  130-159   160-189     >/=190  .      VLDL 10/03/2022 16  0 - 40 mg/dL Final    Triglycerides 10/03/2022 80  0 -  149 mg/dL Final    Comment: .      AGE      DESIRABLE   BORDERLINE HIGH   HIGH     VERY HIGH   0 D-90 D    19 - 174         ----         ----        ----  91 D- 9 Y     0 -  74        75 -  99     >/= 100      ----    10-19 Y     0 -  89        90 - 129     >/= 130      ----    20-24 Y     0 - 114       115 - 149     >/= 150      ----         >24 Y     0 - 149       150 - 199    200- 499    >/= 500  .   Venipuncture immediately after or during the    administration of Metamizole may lead to falsely   low results. Testing should be performed immediately   prior to Metamizole dosing.       Current Outpatient Medications on File Prior to Visit   Medication Sig Dispense Refill    amLODIPine (Norvasc) 5 mg tablet TAKE ONE TABLET BY MOUTH ONCE DAILY. 90 tablet 0    biotin 5 mg capsule Take 1 capsule (5 mg) by mouth once daily.      cholecalciferol (Vitamin D-3) 125 MCG (5000 UT) capsule Take 1 capsule (125 mcg) by mouth once daily.      DULoxetine (Cymbalta) 60 mg DR capsule TAKE ONE CAPSULE BY MOUTH ONCE DAILY. 90 capsule 0    magnesium oxide 400 mg magnesium capsule Take 1 capsule (400 mg) by mouth once daily.      omega 3-dha-epa-fish oil 1,200 (144-216) mg capsule Take 1 capsule (1,200 mg) by mouth once daily.      [DISCONTINUED] aspirin 81 mg EC tablet Take 1 tablet (81 mg) by mouth once daily.       No current facility-administered medications on file prior to visit.     No images are attached to the encounter.            Assessment/Plan   Diagnoses and all orders for this visit:  Abnormal bruising  Other fatigue  Low vitamin B12 level    1.  Patient's labs were discussed at this office visit  2.  Patient will schedule her Medicare wellness visit in October  3.  Patient's LDL goal less than 130 current LDL 94  4.  Patient's B12 level is low we will order a methylmalonic acid and folate acid level  5.  Patient's hemoglobin A1c elevated at 5.9 she will continue a no added sugar diet  6.  Patient to call for questions  or concerns

## 2023-08-22 LAB — FOLATE (NG/ML) IN SER/PLAS: 18.2 NG/ML

## 2023-08-26 LAB — METHYLMALONIC ACID, S: 0.42 UMOL/L (ref 0–0.4)

## 2023-08-30 ENCOUNTER — TELEPHONE (OUTPATIENT)
Dept: PRIMARY CARE | Facility: CLINIC | Age: 83
End: 2023-08-30
Payer: MEDICARE

## 2023-09-15 ENCOUNTER — OFFICE VISIT (OUTPATIENT)
Dept: PRIMARY CARE | Facility: CLINIC | Age: 83
End: 2023-09-15
Payer: MEDICARE

## 2023-09-15 VITALS
WEIGHT: 115.6 LBS | BODY MASS INDEX: 22.2 KG/M2 | DIASTOLIC BLOOD PRESSURE: 70 MMHG | HEART RATE: 81 BPM | SYSTOLIC BLOOD PRESSURE: 138 MMHG

## 2023-09-15 DIAGNOSIS — D51.9 ANEMIA DUE TO VITAMIN B12 DEFICIENCY, UNSPECIFIED B12 DEFICIENCY TYPE: Primary | ICD-10-CM

## 2023-09-15 PROCEDURE — 3075F SYST BP GE 130 - 139MM HG: CPT | Performed by: FAMILY MEDICINE

## 2023-09-15 PROCEDURE — 3078F DIAST BP <80 MM HG: CPT | Performed by: FAMILY MEDICINE

## 2023-09-15 PROCEDURE — 1159F MED LIST DOCD IN RCRD: CPT | Performed by: FAMILY MEDICINE

## 2023-09-15 PROCEDURE — 99214 OFFICE O/P EST MOD 30 MIN: CPT | Performed by: FAMILY MEDICINE

## 2023-09-15 PROCEDURE — 1036F TOBACCO NON-USER: CPT | Performed by: FAMILY MEDICINE

## 2023-09-15 PROCEDURE — 96372 THER/PROPH/DIAG INJ SC/IM: CPT | Performed by: FAMILY MEDICINE

## 2023-09-15 PROCEDURE — 1160F RVW MEDS BY RX/DR IN RCRD: CPT | Performed by: FAMILY MEDICINE

## 2023-09-15 RX ORDER — CYANOCOBALAMIN 1000 UG/ML
1000 INJECTION, SOLUTION INTRAMUSCULAR; SUBCUTANEOUS ONCE
Status: COMPLETED | OUTPATIENT
Start: 2023-09-15 | End: 2023-09-15

## 2023-09-15 RX ADMIN — CYANOCOBALAMIN 1000 MCG: 1000 INJECTION, SOLUTION INTRAMUSCULAR; SUBCUTANEOUS at 17:43

## 2023-09-15 ASSESSMENT — ENCOUNTER SYMPTOMS
SHORTNESS OF BREATH: 0
DIARRHEA: 0
DYSURIA: 0
VOMITING: 0
OCCASIONAL FEELINGS OF UNSTEADINESS: 1
LOSS OF SENSATION IN FEET: 1
ABDOMINAL PAIN: 0
CHILLS: 0
HEMATURIA: 0
DEPRESSION: 0
NAUSEA: 0
FEVER: 0
COUGH: 0
BRUISES/BLEEDS EASILY: 1

## 2023-09-15 ASSESSMENT — PATIENT HEALTH QUESTIONNAIRE - PHQ9
2. FEELING DOWN, DEPRESSED OR HOPELESS: NOT AT ALL
SUM OF ALL RESPONSES TO PHQ9 QUESTIONS 1 AND 2: 0
1. LITTLE INTEREST OR PLEASURE IN DOING THINGS: NOT AT ALL

## 2023-09-15 NOTE — PROGRESS NOTES
Subjective   Patient ID: Kandice Lobo is a 83 y.o. female who presents for No chief complaint on file..    HPI   Comes in to follow-up on recent labs she had performed because of frequent bruising and fatigue.  He has been following up with her orthopedist regarding her leg pain.  Patient reports she is here to discuss starting on B12 injections.  Review of Systems   Constitutional:  Negative for chills and fever.   Respiratory:  Negative for cough and shortness of breath.    Cardiovascular:  Negative for chest pain.   Gastrointestinal:  Negative for abdominal pain, diarrhea, nausea and vomiting.   Genitourinary:  Negative for decreased urine volume, dysuria and hematuria.   Musculoskeletal:         Left knee and leg pain   No swelling  No warmth, no redness    Hematological:  Bruises/bleeds easily.       Objective   There were no vitals taken for this visit.  BSA There is no height or weight on file to calculate BSA.      Physical Exam  Constitutional:       Appearance: Normal appearance.   Cardiovascular:      Rate and Rhythm: Normal rate and regular rhythm.   Pulmonary:      Effort: Pulmonary effort is normal.      Breath sounds: Normal breath sounds.   Abdominal:      General: Abdomen is flat.      Palpations: Abdomen is soft.   Skin:     Findings: Bruising present.   Neurological:      Mental Status: She is alert.       Lab on 08/21/2023   Component Date Value Ref Range Status    Methylmalonic Acid, S 08/21/2023 0.42 (H)  0.00 - 0.40 umol/L Final    Slight elevation 0.41-0.99 umol/L         Consistent with mild vitamin B12 deficiency, renal         insufficiency, or intravascular volume contraction.  Moderate elevation 1.00-9.99 umol/L          Consistent with mild vitamin B12 deficiency.  Massive elevation - Greater than or equal to 10 umol/L          Consistent with significant vitamin B12 deficiency          or with inborn errors of metabolism.  INTERPRETIVE INFORMATION: MMA Serum/Plasma,                              Vitamin B12 Status  This test was developed and its performance characteristics   determined by [facility]. It has not been cleared or   approved by the US Food and Drug Administration. This test was   performed in a CLIA certified laboratory and is intended for   clinical purposes.  Performed By: [facility]  [address] [place] [id number]  : Mynor Peña MD, PhD  CLIA Number: [id number]    Folate 08/21/2023 18.2  >5.0 ng/mL Final    Low           <3.4  Borderline 3.4-5.0  Normal        >5.0  .   Biotin interference may cause falsely elevated results.    Patients taking a Biotin dose of up to 5 mg/day should    refrain from taking Biotin for 24 hours before sample    collection. Providers may contact their local laboratory   for further information.   Lab on 07/27/2023   Component Date Value Ref Range Status    WBC 07/27/2023 5.7  4.4 - 11.3 x10E9/L Final    nRBC 07/27/2023 0.0  0.0 - 0.0 /100 WBC Final    RBC 07/27/2023 4.10  4.00 - 5.20 x10E12/L Final    Hemoglobin 07/27/2023 13.6  12.0 - 16.0 g/dL Final    Hematocrit 07/27/2023 41.9  36.0 - 46.0 % Final    MCV 07/27/2023 102 (H)  80 - 100 fL Final    MCHC 07/27/2023 32.5  32.0 - 36.0 g/dL Final    Platelets 07/27/2023 335  150 - 450 x10E9/L Final    RDW 07/27/2023 12.1  11.5 - 14.5 % Final    Neutrophils % 07/27/2023 57.1  40.0 - 80.0 % Final    Immature Granulocytes %, Automated 07/27/2023 0.2  0.0 - 0.9 % Final     Immature Granulocyte Count (IG) includes promyelocytes,    myelocytes and metamyelocytes but does not include bands.   Percent differential counts (%) should be interpreted in the   context of the absolute cell counts (cells/L).    Lymphocytes % 07/27/2023 30.5  13.0 - 44.0 % Final    Monocytes % 07/27/2023 10.2  2.0 - 10.0 % Final    Eosinophils % 07/27/2023 1.6  0.0 - 6.0 % Final    Basophils % 07/27/2023 0.4  0.0 - 2.0 % Final    Neutrophils Absolute 07/27/2023 3.25  1.60 - 5.50  x10E9/L Final    Lymphocytes Absolute 07/27/2023 1.73  0.80 - 3.00 x10E9/L Final    Monocytes Absolute 07/27/2023 0.58  0.05 - 0.80 x10E9/L Final    Eosinophils Absolute 07/27/2023 0.09  0.00 - 0.40 x10E9/L Final    Basophils Absolute 07/27/2023 0.02  0.00 - 0.10 x10E9/L Final    Glucose 07/27/2023 132 (H)  74 - 99 mg/dL Final    Sodium 07/27/2023 141  136 - 145 mmol/L Final    Potassium 07/27/2023 4.3  3.5 - 5.3 mmol/L Final    Chloride 07/27/2023 106  98 - 107 mmol/L Final    Bicarbonate 07/27/2023 22  21 - 32 mmol/L Final    Anion Gap 07/27/2023 17  10 - 20 mmol/L Final    Urea Nitrogen 07/27/2023 35 (H)  6 - 23 mg/dL Final    Creatinine 07/27/2023 0.95  0.50 - 1.05 mg/dL Final    GFR Female 07/27/2023 59 (A)  >90 mL/min/1.73m2 Final     CALCULATIONS OF ESTIMATED GFR ARE PERFORMED   USING THE 2021 CKD-EPI STUDY REFIT EQUATION   WITHOUT THE RACE VARIABLE FOR THE IDMS-TRACEABLE   CREATININE METHODS.    https://jasn.asnjournals.org/content/early/2021/09/22/ASN.5022058221    Calcium 07/27/2023 9.8  8.6 - 10.6 mg/dL Final    Albumin 07/27/2023 4.7  3.4 - 5.0 g/dL Final    Alkaline Phosphatase 07/27/2023 48  33 - 136 U/L Final    Total Protein 07/27/2023 7.1  6.4 - 8.2 g/dL Final    AST 07/27/2023 14  9 - 39 U/L Final    Total Bilirubin 07/27/2023 0.5  0.0 - 1.2 mg/dL Final    ALT (SGPT) 07/27/2023 6 (L)  7 - 45 U/L Final     Patients treated with Sulfasalazine may generate    falsely decreased results for ALT.    Cholesterol 07/27/2023 169  0 - 199 mg/dL Final    .      AGE      DESIRABLE   BORDERLINE HIGH   HIGH     0-19 Y     0 - 169       170 - 199     >/= 200    20-24 Y     0 - 189       190 - 224     >/= 225         >24 Y     0 - 199       200 - 239     >/= 240   **All ranges are based on fasting samples. Specific   therapeutic targets will vary based on patient-specific   cardiac risk.  .   Pediatric guidelines reference:Pediatrics 2011, 128(S5).   Adult guidelines reference: NCEP ATPIII Guidelines,     [place]  2001, 258:2486-97  .   Venipuncture immediately after or during the    administration of Metamizole may lead to falsely   low results. Testing should be performed immediately   prior to Metamizole dosing.    HDL 07/27/2023 60.8  mg/dL Final    .      AGE      VERY LOW   LOW     NORMAL    HIGH       0-19 Y       < 35   < 40     40-45     ----    20-24 Y       ----   < 40       >45     ----      >24 Y       ----   < 40     40-60      >60  .    Cholesterol/HDL Ratio 07/27/2023 2.8   Final    REF VALUES  DESIRABLE  < 3.4  HIGH RISK  > 5.0    LDL 07/27/2023 94  0 - 99 mg/dL Final    .                           NEAR      BORD      AGE      DESIRABLE  OPTIMAL    HIGH     HIGH     VERY HIGH     0-19 Y     0 - 109     ---    110-129   >/= 130     ----    20-24 Y     0 - 119     ---    120-159   >/= 160     ----      >24 Y     0 -  99   100-129  130-159   160-189     >/=190  .    VLDL 07/27/2023 14  0 - 40 mg/dL Final    Triglycerides 07/27/2023 69  0 - 149 mg/dL Final    .      AGE      DESIRABLE   BORDERLINE HIGH   HIGH     VERY HIGH   0 D-90 D    19 - 174         ----         ----        ----  91 D- 9 Y     0 -  74        75 -  99     >/= 100      ----    10-19 Y     0 -  89        90 - 129     >/= 130      ----    20-24 Y     0 - 114       115 - 149     >/= 150      ----         >24 Y     0 - 149       150 - 199    200- 499    >/= 500  .   Venipuncture immediately after or during the    administration of Metamizole may lead to falsely   low results. Testing should be performed immediately   prior to Metamizole dosing.    TSH 07/27/2023 2.41  0.44 - 3.98 mIU/L Final     TSH testing is performed using different testing    methodology at [facility] than at other    [facility]. Direct result comparisons should    only be made within the same method.    Vitamin D, 25-Hydroxy 07/27/2023 87  ng/mL Final    .  DEFICIENCY:         < 20   NG/ML  INSUFFICIENCY:      20-29  NG/ML  SUFFICIENCY:          NG/ML    THIS ASSAY ACCURATELY QUANTIFIES THE SUM OF  VITAMIN D3, 25-HYDROXY AND VIT D2,25-HYDROXY.    Vitamin B-12 07/27/2023 226  211 - 911 pg/mL Final    Hemoglobin A1C 07/27/2023 5.9 (A)  % Final    Comment:      Diagnosis of Diabetes-Adults   Non-Diabetic: < or = 5.6%   Increased risk for developing diabetes: 5.7-6.4%   Diagnostic of diabetes: > or = 6.5%  .       Monitoring of Diabetes                Age (y)     Therapeutic Goal (%)   Adults:          >18           <7.0   Pediatrics:    13-18           <7.5                   7-12           <8.0                   0- 6            7.5-8.5   American Diabetes Association. Diabetes Care 33(S1), Jan 2010.      Estimated Average Glucose 07/27/2023 123  MG/DL Final   [facility] Encounter on 10/03/2022   Component Date Value Ref Range Status    TSH 10/03/2022 2.42  0.44 - 3.98 mIU/L Final    Comment:  TSH testing is performed using different testing    methodology at [facility] than at other    [facility] hospitals. Direct result comparisons should    only be made within the same method.      Glucose 10/03/2022 100 (H)  74 - 99 mg/dL Final    Sodium 10/03/2022 140  136 - 145 mmol/L Final    Potassium 10/03/2022 4.5  3.5 - 5.3 mmol/L Final    Chloride 10/03/2022 106  98 - 107 mmol/L Final    Bicarbonate 10/03/2022 27  21 - 32 mmol/L Final    Anion Gap 10/03/2022 12  10 - 20 mmol/L Final    Urea Nitrogen 10/03/2022 22  6 - 23 mg/dL Final    Creatinine 10/03/2022 0.75  0.50 - 1.05 mg/dL Final    GFR Female 10/03/2022 79  >90 mL/min/1.73m2 Final    Comment:  CALCULATIONS OF ESTIMATED GFR ARE PERFORMED   USING THE 2021 CKD-EPI STUDY REFIT EQUATION   WITHOUT THE RACE VARIABLE FOR THE IDMS-TRACEABLE   CREATININE METHODS.    https://jasn.asnjournals.org/content/early/2021/09/22/ASN.5685233048      Calcium 10/03/2022 9.7  8.6 - 10.6 mg/dL Final    Albumin 10/03/2022 4.5  3.4 - 5.0 g/dL Final    Alkaline Phosphatase 10/03/2022 55  33 - 136 U/L Final    Total Protein  10/03/2022 6.9  6.4 - 8.2 g/dL Final    AST 10/03/2022 15  9 - 39 U/L Final    Total Bilirubin 10/03/2022 0.8  0.0 - 1.2 mg/dL Final    ALT (SGPT) 10/03/2022 9  7 - 45 U/L Final    Comment:  Patients treated with Sulfasalazine may generate    falsely decreased results for ALT.      Vitamin D, 25-Hydroxy 10/03/2022 80  ng/mL Final    Comment: .  DEFICIENCY:         < 20   NG/ML  INSUFFICIENCY:      20-29  NG/ML  SUFFICIENCY:         NG/ML    THIS ASSAY ACCURATELY QUANTIFIES THE SUM OF  VITAMIN D3, 25-HYDROXY AND VIT D2,25-HYDROXY.      WBC 10/03/2022 9.2  4.4 - 11.3 x10E9/L Final    nRBC 10/03/2022 0.0  0.0 - 0.0 /100 WBC Final    RBC 10/03/2022 4.12  4.00 - 5.20 x10E12/L Final    Hemoglobin 10/03/2022 13.7  12.0 - 16.0 g/dL Final    Hematocrit 10/03/2022 40.7  36.0 - 46.0 % Final    MCV 10/03/2022 99  80 - 100 fL Final    MCHC 10/03/2022 33.7  32.0 - 36.0 g/dL Final    Platelets 10/03/2022 353  150 - 450 x10E9/L Final    RDW 10/03/2022 12.8  11.5 - 14.5 % Final    Neutrophils % 10/03/2022 67.7  40.0 - 80.0 % Final    Immature Granulocytes %, Automated 10/03/2022 0.5  0.0 - 0.9 % Final    Comment:  Immature Granulocyte Count (IG) includes promyelocytes,    myelocytes and metamyelocytes but does not include bands.   Percent differential counts (%) should be interpreted in the   context of the absolute cell counts (cells/L).      Lymphocytes % 10/03/2022 22.6  13.0 - 44.0 % Final    Monocytes % 10/03/2022 7.3  2.0 - 10.0 % Final    Eosinophils % 10/03/2022 1.6  0.0 - 6.0 % Final    Basophils % 10/03/2022 0.3  0.0 - 2.0 % Final    Neutrophils Absolute 10/03/2022 6.22 (H)  1.60 - 5.50 x10E9/L Final    Lymphocytes Absolute 10/03/2022 2.08  0.80 - 3.00 x10E9/L Final    Monocytes Absolute 10/03/2022 0.67  0.05 - 0.80 x10E9/L Final    Eosinophils Absolute 10/03/2022 0.15  0.00 - 0.40 x10E9/L Final    Basophils Absolute 10/03/2022 0.03  0.00 - 0.10 x10E9/L Final    Cholesterol 10/03/2022 130  0 - 199 mg/dL Final     Comment: .      AGE      DESIRABLE   BORDERLINE HIGH   HIGH     0-19 Y     0 - 169       170 - 199     >/= 200    20-24 Y     0 - 189       190 - 224     >/= 225         >24 Y     0 - 199       200 - 239     >/= 240   **All ranges are based on fasting samples. Specific   therapeutic targets will vary based on patient-specific   cardiac risk.  .   Pediatric guidelines reference:Pediatrics 2011, 128(S5).   Adult guidelines reference: NCEP ATPIII Guidelines,     [place] 2001, 258:2486-97  .   Venipuncture immediately after or during the    administration of Metamizole may lead to falsely   low results. Testing should be performed immediately   prior to Metamizole dosing.      HDL 10/03/2022 69.5  mg/dL Final    Comment: .      AGE      VERY LOW   LOW     NORMAL    HIGH       0-19 Y       < 35   < 40     40-45     ----    20-24 Y       ----   < 40       >45     ----      >24 Y       ----   < 40     40-60      >60  .      Cholesterol/HDL Ratio 10/03/2022 1.9   Final    Comment: REF VALUES  DESIRABLE  < 3.4  HIGH RISK  > 5.0      LDL 10/03/2022 45  0 - 99 mg/dL Final    Comment: .                           NEAR      BORD      AGE      DESIRABLE  OPTIMAL    HIGH     HIGH     VERY HIGH     0-19 Y     0 - 109     ---    110-129   >/= 130     ----    20-24 Y     0 - 119     ---    120-159   >/= 160     ----      >24 Y     0 -  99   100-129  130-159   160-189     >/=190  .      VLDL 10/03/2022 16  0 - 40 mg/dL Final    Triglycerides 10/03/2022 80  0 - 149 mg/dL Final    Comment: .      AGE      DESIRABLE   BORDERLINE HIGH   HIGH     VERY HIGH   0 D-90 D    19 - 174         ----         ----        ----  91 D- 9 Y     0 -  74        75 -  99     >/= 100      ----    10-19 Y     0 -  89        90 - 129     >/= 130      ----    20-24 Y     0 - 114       115 - 149     >/= 150      ----         >24 Y     0 - 149       150 - 199    200- 499    >/= 500  .   Venipuncture immediately after or during the    administration of Metamizole may  lead to falsely   low results. Testing should be performed immediately   prior to Metamizole dosing.       Current Outpatient Medications on File Prior to Visit   Medication Sig Dispense Refill    amLODIPine (Norvasc) 5 mg tablet TAKE ONE TABLET BY MOUTH ONCE DAILY. 90 tablet 0    biotin 5 mg capsule Take 1 capsule (5 mg) by mouth once daily.      cholecalciferol (Vitamin D-3) 125 MCG (5000 UT) capsule Take 1 capsule (125 mcg) by mouth once daily.      DULoxetine (Cymbalta) 60 mg DR capsule TAKE ONE CAPSULE BY MOUTH ONCE DAILY. 90 capsule 0    magnesium oxide 400 mg magnesium capsule Take 1 capsule (400 mg) by mouth once daily.      omega 3-dha-epa-fish oil 1,200 (144-216) mg capsule Take 1 capsule (1,200 mg) by mouth once daily.       No current facility-administered medications on file prior to visit.     No images are attached to the encounter.            Assessment/Plan   Diagnoses and all orders for this visit:  Anemia due to vitamin B12 deficiency, unspecified B12 deficiency type    1.  Patient to start on B12 injections  2.  Patient will have repeat CBC with differential B12 folate methylmalonic acid in 3 months   3.  Patient to call for questions or concerns

## 2023-10-04 DIAGNOSIS — I10 PRIMARY HYPERTENSION: ICD-10-CM

## 2023-10-04 RX ORDER — AMLODIPINE BESYLATE 5 MG/1
TABLET ORAL
Qty: 90 TABLET | Refills: 0 | Status: SHIPPED | OUTPATIENT
Start: 2023-10-04 | End: 2024-01-29

## 2023-10-06 ENCOUNTER — OFFICE VISIT (OUTPATIENT)
Dept: PRIMARY CARE | Facility: CLINIC | Age: 83
End: 2023-10-06
Payer: MEDICARE

## 2023-10-06 VITALS
HEART RATE: 77 BPM | HEIGHT: 60 IN | WEIGHT: 112 LBS | SYSTOLIC BLOOD PRESSURE: 132 MMHG | DIASTOLIC BLOOD PRESSURE: 66 MMHG | BODY MASS INDEX: 21.99 KG/M2

## 2023-10-06 DIAGNOSIS — E55.9 VITAMIN D DEFICIENCY: ICD-10-CM

## 2023-10-06 DIAGNOSIS — G62.9 NEUROPATHY: ICD-10-CM

## 2023-10-06 DIAGNOSIS — Z00.00 HEALTHCARE MAINTENANCE: Primary | ICD-10-CM

## 2023-10-06 DIAGNOSIS — K21.9 GASTROESOPHAGEAL REFLUX DISEASE, UNSPECIFIED WHETHER ESOPHAGITIS PRESENT: ICD-10-CM

## 2023-10-06 DIAGNOSIS — Z12.31 ENCOUNTER FOR SCREENING MAMMOGRAM FOR BREAST CANCER: ICD-10-CM

## 2023-10-06 DIAGNOSIS — I10 PRIMARY HYPERTENSION: ICD-10-CM

## 2023-10-06 DIAGNOSIS — R07.9 CHEST PAIN, UNSPECIFIED TYPE: ICD-10-CM

## 2023-10-06 DIAGNOSIS — Z00.00 ROUTINE GENERAL MEDICAL EXAMINATION AT HEALTH CARE FACILITY: ICD-10-CM

## 2023-10-06 DIAGNOSIS — Z71.89 ADVANCE DIRECTIVE DISCUSSED WITH PATIENT: ICD-10-CM

## 2023-10-06 DIAGNOSIS — I49.3 PVC'S (PREMATURE VENTRICULAR CONTRACTIONS): ICD-10-CM

## 2023-10-06 DIAGNOSIS — Z13.89 ENCOUNTER FOR SCREENING FOR OTHER DISORDER: ICD-10-CM

## 2023-10-06 PROBLEM — U07.1 COVID-19: Status: RESOLVED | Noted: 2023-03-09 | Resolved: 2023-10-06

## 2023-10-06 PROCEDURE — G0444 DEPRESSION SCREEN ANNUAL: HCPCS | Performed by: FAMILY MEDICINE

## 2023-10-06 PROCEDURE — 1160F RVW MEDS BY RX/DR IN RCRD: CPT | Performed by: FAMILY MEDICINE

## 2023-10-06 PROCEDURE — 99397 PER PM REEVAL EST PAT 65+ YR: CPT | Performed by: FAMILY MEDICINE

## 2023-10-06 PROCEDURE — 3078F DIAST BP <80 MM HG: CPT | Performed by: FAMILY MEDICINE

## 2023-10-06 PROCEDURE — 1158F ADVNC CARE PLAN TLK DOCD: CPT | Performed by: FAMILY MEDICINE

## 2023-10-06 PROCEDURE — 99214 OFFICE O/P EST MOD 30 MIN: CPT | Performed by: FAMILY MEDICINE

## 2023-10-06 PROCEDURE — G0439 PPPS, SUBSEQ VISIT: HCPCS | Performed by: FAMILY MEDICINE

## 2023-10-06 PROCEDURE — 3075F SYST BP GE 130 - 139MM HG: CPT | Performed by: FAMILY MEDICINE

## 2023-10-06 PROCEDURE — 99497 ADVNCD CARE PLAN 30 MIN: CPT | Performed by: FAMILY MEDICINE

## 2023-10-06 PROCEDURE — 1036F TOBACCO NON-USER: CPT | Performed by: FAMILY MEDICINE

## 2023-10-06 PROCEDURE — 1159F MED LIST DOCD IN RCRD: CPT | Performed by: FAMILY MEDICINE

## 2023-10-06 PROCEDURE — 1170F FXNL STATUS ASSESSED: CPT | Performed by: FAMILY MEDICINE

## 2023-10-06 PROCEDURE — 93000 ELECTROCARDIOGRAM COMPLETE: CPT | Performed by: FAMILY MEDICINE

## 2023-10-06 ASSESSMENT — PATIENT HEALTH QUESTIONNAIRE - PHQ9
1. LITTLE INTEREST OR PLEASURE IN DOING THINGS: NOT AT ALL
2. FEELING DOWN, DEPRESSED OR HOPELESS: NOT AT ALL
SUM OF ALL RESPONSES TO PHQ9 QUESTIONS 1 AND 2: 0

## 2023-10-06 ASSESSMENT — ENCOUNTER SYMPTOMS
DIZZINESS: 0
OCCASIONAL FEELINGS OF UNSTEADINESS: 0
DEPRESSION: 0
APPETITE CHANGE: 0
LIGHT-HEADEDNESS: 0
COLOR CHANGE: 0
ARTHRALGIAS: 0
FEVER: 0
FATIGUE: 0
PALPITATIONS: 0
HEADACHES: 0
CHOKING: 0
COUGH: 0
LOSS OF SENSATION IN FEET: 0
CHEST TIGHTNESS: 0
FACIAL ASYMMETRY: 0
BACK PAIN: 0
ACTIVITY CHANGE: 0

## 2023-10-06 ASSESSMENT — ACTIVITIES OF DAILY LIVING (ADL)
GROCERY_SHOPPING: INDEPENDENT
MANAGING_FINANCES: INDEPENDENT
DRESSING: INDEPENDENT
TAKING_MEDICATION: INDEPENDENT
DOING_HOUSEWORK: INDEPENDENT
BATHING: INDEPENDENT

## 2023-10-06 NOTE — ACP (ADVANCE CARE PLANNING)
Confirming Previous Code Status:   Advance Care Planning Note     Discussion Date: 10/06/23   Discussion Participants: patient    The patient wishes to discuss Advance Care Planning today and the following is a brief summary of our discussion.     Patient has capacity to make their own medical decisions: Yes  Health Care Agent/Surrogate Decision Maker documented in chart: Yes  Arturo fitch  Documents on file and valid:  Advance Directive/Living Will: Yes   Health Care Power of : Yes  Other:     Communication of Medical Status/Prognosis:   stable    Communication of Treatment Goals/Options:   stable    Treatment Decisions  Goals of Care: quality of life is prioritized; willing to accept low-burden treatments to achieve meaningful goals     Follow Up Plan  stable  Team Members  stable  Time Statement: Total face to face time spent on advance care planning was 5 minutes with 16 minutes spent in counseling, including the explanation.    Vanita Giang DO  10/6/2023 11:14 AM

## 2023-10-06 NOTE — PROGRESS NOTES
Subjective   Reason for Visit: Kandice Lobo is an 83 y.o. female here for a Medicare Wellness visit.     Past Medical, Surgical, and Family History reviewed and updated in chart.    Reviewed all medications by prescribing practitioner or clinical pharmacist (such as prescriptions, OTCs, herbal therapies and supplements) and documented in the medical record.    HPI  Patient presents for physical exam.  Reports she had a rough night last night that she was having difficulty with some back pain currently she is still having some squeezing type feeling on her left side of her chest.  She reports that she did have 2 chewed 81 mg aspirin last night and this improved her symptoms slightly.     Fam Hx  Mom (82) , kidney to bone cancer (70)  Dad (91) , tobacco, sepsis,      Exercise walks  ETOH denies  Caffeine 1-2 cups tea/day, one soda every couple of days  Tobacco denies  4 sons     Mammogram due   DEXA  , osteopenia, [facility] due   Colonoscopy  Dr. Larsen due ?     Patient denies other complaints at this time.   Patient Self Assessment of Health Status  Patient Self Assessment: Good    Nutrition and Exercise  Current Diet: Well Balanced Diet  Adequate Fluid Intake: No  Caffeine: Yes  Exercise Frequency: Infrequently    Functional Ability/Level of Safety  Cognitive Impairment Observed: No cognitive impairment observed  Cognitive Impairment Reported: No cognitive impairment reported by patient or family    Home Safety Risk Factors: None    Patient Care Team:  Vanita Giang DO as PCP - General  Vanita Giang DO as PCP - [employer] PCP  Mynor Branch MD as Referring Physician (Orthopaedic Surgery)     Review of Systems   Constitutional:  Negative for activity change, appetite change, fatigue and fever.   HENT:  Negative for congestion.    Respiratory:  Negative for cough, choking and chest tightness.    Cardiovascular:  Negative for chest pain,  palpitations and leg swelling.   Musculoskeletal:  Negative for arthralgias, back pain and gait problem.   Skin:  Negative for color change and pallor.   Neurological:  Negative for dizziness, facial asymmetry, light-headedness and headaches.       Objective   Vitals:  /66 (BP Location: Left arm)   Pulse 77   Ht 1.524 m (5')   Wt 50.8 kg (112 lb)   BMI 21.87 kg/m²       Physical Exam  Constitutional:       General: She is not in acute distress.     Appearance: Normal appearance. She is not toxic-appearing.   HENT:      Head: Normocephalic.      Right Ear: Tympanic membrane, ear canal and external ear normal.      Left Ear: Tympanic membrane, ear canal and external ear normal.      Nose: Nose normal.      Mouth/Throat:      Pharynx: Oropharynx is clear.   Eyes:      Conjunctiva/sclera: Conjunctivae normal.      Pupils: Pupils are equal, round, and reactive to light.   Cardiovascular:      Rate and Rhythm: Normal rate and regular rhythm.      Pulses: Normal pulses.      Heart sounds: Normal heart sounds.   Pulmonary:      Effort: No respiratory distress.      Breath sounds: No wheezing, rhonchi or rales.   Abdominal:      General: Bowel sounds are normal. There is no distension.      Palpations: Abdomen is soft.      Tenderness: There is no abdominal tenderness.   Musculoskeletal:         General: No swelling or tenderness.      Cervical back: No tenderness.   Skin:     Findings: No lesion or rash.   Neurological:      General: No focal deficit present.      Mental Status: She is alert and oriented to person, place, and time. Mental status is at baseline.      Gait: Gait normal.   Psychiatric:         Mood and Affect: Mood normal.         Behavior: Behavior normal.         Thought Content: Thought content normal.         Judgment: Judgment normal.         Assessment/Plan   Problem List Items Addressed This Visit    None  Visit Diagnoses       Healthcare maintenance    -  Primary        1. Patient's blood  work discussed at this office visit  2. Patient's LDL goal less than 70, current LDL 94  3. Patient's glucose elevated continue a no added sugar diet, patient's hemoglobin A1c 5.9. This is at increased risk of developing diabetes  4. Patient's thyroid tests back to normal, continue to monitor  5. Mammogram due 2023  6. DEXA 2019 , osteopenia, [facility] due 2024  7. Colonoscopy 2016 Dr. Larsen does not need again per patient  8.  New PVCs on EKG would recommend follow-up with cardiology especially with patient's chest discomfort that she has been having  9. Patient to call if questions or concerns.

## 2023-10-13 ENCOUNTER — CLINICAL SUPPORT (OUTPATIENT)
Dept: PRIMARY CARE | Facility: CLINIC | Age: 83
End: 2023-10-13
Payer: MEDICARE

## 2023-10-13 DIAGNOSIS — D51.9 ANEMIA DUE TO VITAMIN B12 DEFICIENCY, UNSPECIFIED B12 DEFICIENCY TYPE: ICD-10-CM

## 2023-10-13 PROCEDURE — 96372 THER/PROPH/DIAG INJ SC/IM: CPT | Performed by: FAMILY MEDICINE

## 2023-10-13 RX ORDER — CYANOCOBALAMIN 1000 UG/ML
1000 INJECTION, SOLUTION INTRAMUSCULAR; SUBCUTANEOUS ONCE
Status: COMPLETED | OUTPATIENT
Start: 2023-10-13 | End: 2023-10-13

## 2023-10-13 RX ADMIN — CYANOCOBALAMIN 1000 MCG: 1000 INJECTION, SOLUTION INTRAMUSCULAR; SUBCUTANEOUS at 15:23

## 2023-10-13 NOTE — PROGRESS NOTES
Pt presents for monthly B12 injection per Dr Giang. 1 mL given subcutaneously in upper L arm; no issues w/ injection. Pt tolerated well.

## 2023-10-17 ENCOUNTER — OFFICE VISIT (OUTPATIENT)
Dept: CARDIOLOGY | Facility: CLINIC | Age: 83
End: 2023-10-17
Payer: MEDICARE

## 2023-10-17 VITALS
DIASTOLIC BLOOD PRESSURE: 70 MMHG | BODY MASS INDEX: 21.91 KG/M2 | WEIGHT: 111.6 LBS | HEIGHT: 60 IN | OXYGEN SATURATION: 97 % | SYSTOLIC BLOOD PRESSURE: 142 MMHG | HEART RATE: 69 BPM

## 2023-10-17 DIAGNOSIS — I10 PRIMARY HYPERTENSION: ICD-10-CM

## 2023-10-17 DIAGNOSIS — I49.3 PVC'S (PREMATURE VENTRICULAR CONTRACTIONS): ICD-10-CM

## 2023-10-17 DIAGNOSIS — R07.9 CHEST PAIN, UNSPECIFIED TYPE: Primary | ICD-10-CM

## 2023-10-17 PROCEDURE — 1036F TOBACCO NON-USER: CPT | Performed by: STUDENT IN AN ORGANIZED HEALTH CARE EDUCATION/TRAINING PROGRAM

## 2023-10-17 PROCEDURE — 99214 OFFICE O/P EST MOD 30 MIN: CPT | Performed by: STUDENT IN AN ORGANIZED HEALTH CARE EDUCATION/TRAINING PROGRAM

## 2023-10-17 PROCEDURE — 99204 OFFICE O/P NEW MOD 45 MIN: CPT | Performed by: STUDENT IN AN ORGANIZED HEALTH CARE EDUCATION/TRAINING PROGRAM

## 2023-10-17 PROCEDURE — 1160F RVW MEDS BY RX/DR IN RCRD: CPT | Performed by: STUDENT IN AN ORGANIZED HEALTH CARE EDUCATION/TRAINING PROGRAM

## 2023-10-17 PROCEDURE — 1159F MED LIST DOCD IN RCRD: CPT | Performed by: STUDENT IN AN ORGANIZED HEALTH CARE EDUCATION/TRAINING PROGRAM

## 2023-10-17 PROCEDURE — 3078F DIAST BP <80 MM HG: CPT | Performed by: STUDENT IN AN ORGANIZED HEALTH CARE EDUCATION/TRAINING PROGRAM

## 2023-10-17 PROCEDURE — 3077F SYST BP >= 140 MM HG: CPT | Performed by: STUDENT IN AN ORGANIZED HEALTH CARE EDUCATION/TRAINING PROGRAM

## 2023-10-17 PROCEDURE — 1158F ADVNC CARE PLAN TLK DOCD: CPT | Performed by: STUDENT IN AN ORGANIZED HEALTH CARE EDUCATION/TRAINING PROGRAM

## 2023-10-17 ASSESSMENT — ENCOUNTER SYMPTOMS
GASTROINTESTINAL NEGATIVE: 1
CONSTITUTIONAL NEGATIVE: 1
PALPITATIONS: 0
EYES NEGATIVE: 1
BRUISES/BLEEDS EASILY: 1
RESPIRATORY NEGATIVE: 1
SYNCOPE: 0
PND: 0
NEUROLOGICAL NEGATIVE: 1
MUSCULOSKELETAL NEGATIVE: 1
PSYCHIATRIC NEGATIVE: 1
ENDOCRINE NEGATIVE: 1
ALLERGIC/IMMUNOLOGIC NEGATIVE: 1
ORTHOPNEA: 0

## 2023-10-17 NOTE — PATIENT INSTRUCTIONS
For your chest pain we will investigate with a heart ultrasound and stress test.  We will see you back in heart clinic after your testing.       Thank you for your visit today. Please contact our office (via [email or web address] or phone) with any additional questions.     [facility] / [place] / [facility] [place]    , [place]/Clinic Nurse for:    Dr. Go Hernandez    [address], [address]  [place] [address]    Phone: [phone] Press Option 5 then Option 3 to speak with the Clinic Nurse ()    _____    To Reach:    Billing Questions -    [phone]  Scheduling / Rescheduling -  Option 1  Refills / Medication Requests -  Option 3  General Office / [place] -  Option 4  Results -     Option 6  Medical Records -    Option 7  Repeat Options -    Option 9

## 2023-10-17 NOTE — PROGRESS NOTES
Cardiology New Patient History and Physical    Reason for referral: chest pain     HPI: Kandice Lobo is a 83 y.o.  female who was referred by her PCP for chest pain. Past medical history of HTN, DLD, osteopenia, and arthritis.     Patient presented to cardiology clinic 10/17/23.  Patient notes chest pain at night last week.  Patient was not exerting herself at the time.  Pain was centrally located, lasted for ~ 1 hour. Patient took 325 of aspirin, pain subsided spontaneously.  Patient was short of breath with episode. No nausea, vomiting or diaphoresis.  Patient has had some family stressors lately.        Past Medical History:   She has a past medical history of COVID-19 (2023), Influenza due to other identified influenza virus with other respiratory manifestations (2017), Otitis media, unspecified, right ear (2019), Pain in left ankle and joints of left foot (2016), Pain in left foot (2016), Personal history of diseases of the skin and subcutaneous tissue (2019), Personal history of other diseases of the respiratory system (2018), Personal history of other diseases of the respiratory system (2021), Personal history of other drug therapy (2015), and Viral infection, unspecified (2017).    Surgical History:   She has a past surgical history that includes Shoulder surgery (2015); Back surgery (2015); Other surgical history (2015); and Cataract extraction (11/15/2017).    Family History:   Family History   Problem Relation Name Age of Onset    Kidney cancer Mother      Bone cancer Mother      Dementia Father      Other (SEPTICEMIA) Father       Allergies:  Lisinopril     Social History:   - Never a smoker; no alcohol use; no illicit drug use; 4 sons (1 - drowned; 3 other sons leaving)    Prior Cardiovascular Testing (personally reviewed):     ECG (10/6/2023- reviewed; per my interpretation)- sinus rhythm;  PVCs    Lipid Panel (7/27/2023)- total 169, HDL 60, LDL 94, Triglycerides 69 mg/dl    Carotid Duplex (11/20/20217)  - No sonographic evidence of plaque formation in the left cervical carotid arterial system.  - Very mild right carotid bulb intimal thickening. Otherwise, no identifiable right-sided plaque formation.    Review of Systems:  Review of Systems   Constitutional: Negative.   HENT: Negative.     Eyes: Negative.    Cardiovascular:  Positive for chest pain. Negative for orthopnea, palpitations, paroxysmal nocturnal dyspnea and syncope.   Respiratory: Negative.     Endocrine: Negative.    Hematologic/Lymphatic: Bruises/bleeds easily.   Skin: Negative.    Musculoskeletal: Negative.    Gastrointestinal: Negative.    Genitourinary: Negative.    Neurological: Negative.    Psychiatric/Behavioral: Negative.     Allergic/Immunologic: Negative.        Objective     Outpatient Medications:    Current Outpatient Medications:     amLODIPine (Norvasc) 5 mg tablet, TAKE ONE TABLET BY MOUTH ONCE DAILY., Disp: 90 tablet, Rfl: 0    cholecalciferol (Vitamin D-3) 125 MCG (5000 UT) capsule, Take 1 capsule (5,000 Units) by mouth once daily., Disp: , Rfl:     DULoxetine (Cymbalta) 60 mg DR capsule, TAKE ONE CAPSULE BY MOUTH ONCE DAILY., Disp: 90 capsule, Rfl: 0    magnesium oxide 400 mg magnesium capsule, Take 1 capsule (400 mg) by mouth once daily., Disp: , Rfl:     omega 3-dha-epa-fish oil 1,200 (144-216) mg capsule, Take 1 capsule (1,200 mg) by mouth once daily., Disp: , Rfl:      Last Recorded Vitals  /70 (BP Location: Left arm, Patient Position: Sitting, BP Cuff Size: Small adult)   Pulse 69   Ht 1.524 m (5')   Wt 50.6 kg (111 lb 9.6 oz)   SpO2 97%   BMI 21.80 kg/m²     Physical Exam:  Physical Exam  HENT:      Head: Normocephalic and atraumatic.      Mouth/Throat:      Mouth: Mucous membranes are moist.   Eyes:      Extraocular Movements: Extraocular movements intact.      Conjunctiva/sclera: Conjunctivae normal.  "  Neck:      Vascular: No carotid bruit.   Cardiovascular:      Rate and Rhythm: Normal rate and regular rhythm.      Pulses: Normal pulses.      Heart sounds: No murmur heard.  Pulmonary:      Effort: Pulmonary effort is normal.      Breath sounds: Normal breath sounds.   Abdominal:      General: Bowel sounds are normal.      Palpations: Abdomen is soft.   Musculoskeletal:      Right lower leg: No edema.      Left lower leg: No edema.   Skin:     General: Skin is warm and dry.   Neurological:      General: No focal deficit present.      Mental Status: She is alert. Mental status is at baseline.      Cranial Nerves: No cranial nerve deficit.      Motor: No weakness.   Psychiatric:         Mood and Affect: Mood normal.         Behavior: Behavior normal.         Lab Review:    Lab Results   Component Value Date    GLUCOSE 132 (H) 07/27/2023    CALCIUM 9.8 07/27/2023     07/27/2023    K 4.3 07/27/2023    CO2 22 07/27/2023     07/27/2023    BUN 35 (H) 07/27/2023    CREATININE 0.95 07/27/2023       Lab Results   Component Value Date    WBC 5.7 07/27/2023    HGB 13.6 07/27/2023    HCT 41.9 07/27/2023     (H) 07/27/2023     07/27/2023       Lab Results   Component Value Date    CHOL 169 07/27/2023    CHOL 130 10/03/2022    CHOL 185 09/01/2021     Lab Results   Component Value Date    HDL 60.8 07/27/2023    HDL 69.5 10/03/2022    HDL 59.0 09/01/2021     No results found for: \"LDLCALC\"  Lab Results   Component Value Date    TRIG 69 07/27/2023    TRIG 80 10/03/2022    TRIG 89 09/01/2021     No components found for: \"CHOLHDL\"    No results found for: \"BNP\"    Lab Results   Component Value Date    TSH 2.41 07/27/2023       Assessment:    83 y.o.  female who was referred by her PCP for chest pain. Past medical history of HTN, DLD, osteopenia, and arthritis.     Patient with recent episodes of chest pain, dyspnea (anginal equivalent); Patient has multiple risk factors for CAD (HTN, pre-DM, " family history of CVD- father). Recommend further evaluation with TTE and exercise stress echo.     Overall Plan:  1. Chest pain, dyspnea; multiple CV risk factors- exercise stress echo; complete TTE    2. HTN (goal BP < 130/90 mmHg)- continue amlodipine; if not at goal increase amlodipine to 10 mg daily    3. Dyslipidemia (goal LDL < 70 mg/dl)- continue dietary and lifestyle modifications    4. Discussed red flag symptoms that should prompt immediate medical attention; patient verbalized understanding.     Disposition: Return to cardiology clinic after above testing    Franc Stiles MD

## 2023-10-24 ENCOUNTER — TELEPHONE (OUTPATIENT)
Dept: CARDIOLOGY | Facility: CLINIC | Age: 83
End: 2023-10-24
Payer: MEDICARE

## 2023-10-24 DIAGNOSIS — R07.9 CHEST PAIN, UNSPECIFIED TYPE: Primary | ICD-10-CM

## 2023-10-24 NOTE — TELEPHONE ENCOUNTER
Pt called stating that an exercise stress test was ordered but she has knee problems, she is requesting an alternative test. Please advise.

## 2023-11-08 ENCOUNTER — ANCILLARY PROCEDURE (OUTPATIENT)
Dept: RADIOLOGY | Facility: CLINIC | Age: 83
End: 2023-11-08
Payer: MEDICARE

## 2023-11-08 ENCOUNTER — HOSPITAL ENCOUNTER (OUTPATIENT)
Dept: CARDIOLOGY | Facility: CLINIC | Age: 83
Discharge: HOME | End: 2023-11-08
Payer: MEDICARE

## 2023-11-08 VITALS
WEIGHT: 111 LBS | DIASTOLIC BLOOD PRESSURE: 70 MMHG | BODY MASS INDEX: 21.79 KG/M2 | HEIGHT: 60 IN | SYSTOLIC BLOOD PRESSURE: 142 MMHG

## 2023-11-08 DIAGNOSIS — R07.9 CHEST PAIN, UNSPECIFIED TYPE: Primary | ICD-10-CM

## 2023-11-08 DIAGNOSIS — I49.3 PVC'S (PREMATURE VENTRICULAR CONTRACTIONS): ICD-10-CM

## 2023-11-08 DIAGNOSIS — R07.89 OTHER CHEST PAIN: ICD-10-CM

## 2023-11-08 DIAGNOSIS — I25.10 CAD (CORONARY ARTERY DISEASE): Primary | ICD-10-CM

## 2023-11-08 DIAGNOSIS — R07.9 CHEST PAIN, UNSPECIFIED TYPE: ICD-10-CM

## 2023-11-08 PROCEDURE — 93017 CV STRESS TEST TRACING ONLY: CPT

## 2023-11-08 PROCEDURE — 78452 HT MUSCLE IMAGE SPECT MULT: CPT

## 2023-11-08 PROCEDURE — 78452 HT MUSCLE IMAGE SPECT MULT: CPT | Performed by: INTERNAL MEDICINE

## 2023-11-08 PROCEDURE — 93016 CV STRESS TEST SUPVJ ONLY: CPT | Performed by: STUDENT IN AN ORGANIZED HEALTH CARE EDUCATION/TRAINING PROGRAM

## 2023-11-08 PROCEDURE — 2500000004 HC RX 250 GENERAL PHARMACY W/ HCPCS (ALT 636 FOR OP/ED): Performed by: STUDENT IN AN ORGANIZED HEALTH CARE EDUCATION/TRAINING PROGRAM

## 2023-11-08 PROCEDURE — 93306 TTE W/DOPPLER COMPLETE: CPT

## 2023-11-08 PROCEDURE — 93306 TTE W/DOPPLER COMPLETE: CPT | Performed by: STUDENT IN AN ORGANIZED HEALTH CARE EDUCATION/TRAINING PROGRAM

## 2023-11-08 RX ORDER — REGADENOSON 0.08 MG/ML
0.4 INJECTION, SOLUTION INTRAVENOUS ONCE
Status: COMPLETED | OUTPATIENT
Start: 2023-11-08 | End: 2023-11-08

## 2023-11-08 RX ADMIN — REGADENOSON 0.4 MG: 0.08 INJECTION, SOLUTION INTRAVENOUS at 09:40

## 2023-11-09 LAB
AORTIC VALVE MEAN GRADIENT: 3.6
AORTIC VALVE PEAK VELOCITY: 1.34
AV PEAK GRADIENT: 7.1
AVA (PEAK VEL): 1.94
AVA (VTI): 1.92
EJECTION FRACTION APICAL 4 CHAMBER: 64
EJECTION FRACTION: 64
LEFT VENTRICLE INTERNAL DIMENSION DIASTOLE: 3.72 (ref 3.5–6)
LEFT VENTRICULAR OUTFLOW TRACT DIAMETER: 1.89
MITRAL VALVE E/A RATIO: 0.75
RIGHT VENTRICLE FREE WALL PEAK S': 18
RIGHT VENTRICLE PEAK SYSTOLIC PRESSURE: 28.2
TRICUSPID ANNULAR PLANE SYSTOLIC EXCURSION: 2

## 2023-11-10 ENCOUNTER — TELEPHONE (OUTPATIENT)
Dept: CARDIOLOGY | Facility: CLINIC | Age: 83
End: 2023-11-10
Payer: MEDICARE

## 2023-11-10 NOTE — TELEPHONE ENCOUNTER
Left voicemail message for patient. Testing looked ok per Dr. Stiles.  Please call office with questions/concerns/symptoms.

## 2023-11-10 NOTE — TELEPHONE ENCOUNTER
----- Message from Courtney Ram RN sent at 11/9/2023  3:47 PM EST -----    ----- Message -----  From: Franc Stiles MD  Sent: 11/9/2023   8:42 AM EST  To: Jill Alvarado RN    Please let Kandice know that her stress test look good. No evidence of ischemia.     ----- Message -----  From: Interface, Radiology Results In  Sent: 11/8/2023   3:48 PM EST  To: Franc Stiles MD

## 2023-11-13 ENCOUNTER — APPOINTMENT (OUTPATIENT)
Dept: PRIMARY CARE | Facility: CLINIC | Age: 83
End: 2023-11-13
Payer: MEDICARE

## 2023-11-14 ENCOUNTER — APPOINTMENT (OUTPATIENT)
Dept: CARDIOLOGY | Facility: CLINIC | Age: 83
End: 2023-11-14
Payer: MEDICARE

## 2023-11-21 ENCOUNTER — APPOINTMENT (OUTPATIENT)
Dept: PRIMARY CARE | Facility: CLINIC | Age: 83
End: 2023-11-21
Payer: MEDICARE

## 2023-11-24 ENCOUNTER — OFFICE VISIT (OUTPATIENT)
Dept: PRIMARY CARE | Facility: CLINIC | Age: 83
End: 2023-11-24
Payer: MEDICARE

## 2023-11-24 ENCOUNTER — LAB (OUTPATIENT)
Dept: LAB | Facility: LAB | Age: 83
End: 2023-11-24
Payer: MEDICARE

## 2023-11-24 VITALS
WEIGHT: 111 LBS | TEMPERATURE: 97.1 F | BODY MASS INDEX: 21.79 KG/M2 | HEIGHT: 60 IN | OXYGEN SATURATION: 96 % | HEART RATE: 67 BPM | SYSTOLIC BLOOD PRESSURE: 130 MMHG | DIASTOLIC BLOOD PRESSURE: 54 MMHG

## 2023-11-24 DIAGNOSIS — D51.9 ANEMIA DUE TO VITAMIN B12 DEFICIENCY, UNSPECIFIED B12 DEFICIENCY TYPE: ICD-10-CM

## 2023-11-24 DIAGNOSIS — J39.2 THROAT IRRITATION: Primary | ICD-10-CM

## 2023-11-24 DIAGNOSIS — R79.89 LOW VITAMIN B12 LEVEL: ICD-10-CM

## 2023-11-24 DIAGNOSIS — M25.561 ACUTE PAIN OF RIGHT KNEE: ICD-10-CM

## 2023-11-24 LAB
BASOPHILS # BLD AUTO: 0.03 X10*3/UL (ref 0–0.1)
BASOPHILS NFR BLD AUTO: 0.4 %
EOSINOPHIL # BLD AUTO: 0.13 X10*3/UL (ref 0–0.4)
EOSINOPHIL NFR BLD AUTO: 1.8 %
ERYTHROCYTE [DISTWIDTH] IN BLOOD BY AUTOMATED COUNT: 12.5 % (ref 11.5–14.5)
HCT VFR BLD AUTO: 40.2 % (ref 36–46)
HGB BLD-MCNC: 12.9 G/DL (ref 12–16)
IMM GRANULOCYTES # BLD AUTO: 0.02 X10*3/UL (ref 0–0.5)
IMM GRANULOCYTES NFR BLD AUTO: 0.3 % (ref 0–0.9)
LYMPHOCYTES # BLD AUTO: 1.91 X10*3/UL (ref 0.8–3)
LYMPHOCYTES NFR BLD AUTO: 26.6 %
MCH RBC QN AUTO: 31.7 PG (ref 26–34)
MCHC RBC AUTO-ENTMCNC: 32.1 G/DL (ref 32–36)
MCV RBC AUTO: 99 FL (ref 80–100)
MONOCYTES # BLD AUTO: 0.6 X10*3/UL (ref 0.05–0.8)
MONOCYTES NFR BLD AUTO: 8.4 %
NEUTROPHILS # BLD AUTO: 4.48 X10*3/UL (ref 1.6–5.5)
NEUTROPHILS NFR BLD AUTO: 62.5 %
NRBC BLD-RTO: 0 /100 WBCS (ref 0–0)
PLATELET # BLD AUTO: 364 X10*3/UL (ref 150–450)
RBC # BLD AUTO: 4.07 X10*6/UL (ref 4–5.2)
WBC # BLD AUTO: 7.2 X10*3/UL (ref 4.4–11.3)

## 2023-11-24 PROCEDURE — 36415 COLL VENOUS BLD VENIPUNCTURE: CPT

## 2023-11-24 PROCEDURE — 82746 ASSAY OF FOLIC ACID SERUM: CPT

## 2023-11-24 PROCEDURE — 1158F ADVNC CARE PLAN TLK DOCD: CPT | Performed by: FAMILY MEDICINE

## 2023-11-24 PROCEDURE — 82607 VITAMIN B-12: CPT

## 2023-11-24 PROCEDURE — 99214 OFFICE O/P EST MOD 30 MIN: CPT | Performed by: FAMILY MEDICINE

## 2023-11-24 PROCEDURE — 1036F TOBACCO NON-USER: CPT | Performed by: FAMILY MEDICINE

## 2023-11-24 PROCEDURE — 85025 COMPLETE CBC W/AUTO DIFF WBC: CPT

## 2023-11-24 PROCEDURE — 3075F SYST BP GE 130 - 139MM HG: CPT | Performed by: FAMILY MEDICINE

## 2023-11-24 PROCEDURE — 3078F DIAST BP <80 MM HG: CPT | Performed by: FAMILY MEDICINE

## 2023-11-24 PROCEDURE — 1160F RVW MEDS BY RX/DR IN RCRD: CPT | Performed by: FAMILY MEDICINE

## 2023-11-24 PROCEDURE — 83921 ORGANIC ACID SINGLE QUANT: CPT

## 2023-11-24 PROCEDURE — 96372 THER/PROPH/DIAG INJ SC/IM: CPT | Performed by: FAMILY MEDICINE

## 2023-11-24 PROCEDURE — 1159F MED LIST DOCD IN RCRD: CPT | Performed by: FAMILY MEDICINE

## 2023-11-24 RX ORDER — CEPHALEXIN 500 MG/1
500 CAPSULE ORAL 2 TIMES DAILY
Qty: 20 CAPSULE | Refills: 0 | Status: SHIPPED | OUTPATIENT
Start: 2023-11-24 | End: 2023-12-04

## 2023-11-24 RX ORDER — CYANOCOBALAMIN 1000 UG/ML
1000 INJECTION, SOLUTION INTRAMUSCULAR; SUBCUTANEOUS ONCE
Status: COMPLETED | OUTPATIENT
Start: 2023-11-24 | End: 2023-11-24

## 2023-11-24 RX ADMIN — CYANOCOBALAMIN 1000 MCG: 1000 INJECTION, SOLUTION INTRAMUSCULAR; SUBCUTANEOUS at 11:17

## 2023-11-24 ASSESSMENT — PATIENT HEALTH QUESTIONNAIRE - PHQ9
SUM OF ALL RESPONSES TO PHQ9 QUESTIONS 1 AND 2: 0
1. LITTLE INTEREST OR PLEASURE IN DOING THINGS: NOT AT ALL
2. FEELING DOWN, DEPRESSED OR HOPELESS: NOT AT ALL

## 2023-11-24 ASSESSMENT — ENCOUNTER SYMPTOMS
FATIGUE: 0
JOINT SWELLING: 0
CHEST TIGHTNESS: 0
FEVER: 0
DIZZINESS: 0
RHINORRHEA: 0
COUGH: 0
BACK PAIN: 0
HEADACHES: 0
MYALGIAS: 1
CHOKING: 0
SORE THROAT: 1
LIGHT-HEADEDNESS: 0
COLOR CHANGE: 0
ACTIVITY CHANGE: 0
APPETITE CHANGE: 0
ARTHRALGIAS: 1
FACIAL ASYMMETRY: 0
PALPITATIONS: 0

## 2023-11-24 NOTE — PROGRESS NOTES
Subjective   Patient ID: Kandice Lobo is a 83 y.o. female who presents for Throat Problem (Sleeping on couch after having it cleaned/Right knee pain X today ).    HPI   Patient reports she had her house professionally cleaned and now her throat is hurting.  Patient reports she has hurt her knee getting out of bed this morning.    Review of Systems   Constitutional:  Negative for activity change, appetite change, fatigue and fever.   HENT:  Positive for sore throat. Negative for congestion, nosebleeds, postnasal drip and rhinorrhea.    Respiratory:  Negative for cough, choking and chest tightness.    Cardiovascular:  Negative for chest pain, palpitations and leg swelling.   Musculoskeletal:  Positive for arthralgias and myalgias. Negative for back pain, gait problem and joint swelling.   Skin:  Negative for color change and pallor.   Neurological:  Negative for dizziness, facial asymmetry, light-headedness and headaches.       Objective   /54   Pulse 67   Temp 36.2 °C (97.1 °F)   Ht 1.524 m (5')   Wt 50.3 kg (111 lb)   SpO2 96%   BMI 21.68 kg/m²   BSA Body surface area is 1.46 meters squared.      Physical Exam  Constitutional:       General: She is not in acute distress.     Appearance: Normal appearance. She is not toxic-appearing.   HENT:      Head: Normocephalic.      Right Ear: Tympanic membrane, ear canal and external ear normal.      Left Ear: Tympanic membrane, ear canal and external ear normal.      Mouth/Throat:      Mouth: Mucous membranes are moist.      Pharynx: Oropharynx is clear. No oropharyngeal exudate or posterior oropharyngeal erythema.   Eyes:      Conjunctiva/sclera: Conjunctivae normal.      Pupils: Pupils are equal, round, and reactive to light.   Cardiovascular:      Rate and Rhythm: Normal rate and regular rhythm.      Pulses: Normal pulses.      Heart sounds: Normal heart sounds.   Pulmonary:      Effort: No respiratory distress.      Breath sounds: No wheezing, rhonchi or  rales.   Musculoskeletal:         General: No swelling or tenderness.   Skin:     Findings: No lesion or rash.   Neurological:      General: No focal deficit present.      Mental Status: She is alert and oriented to person, place, and time. Mental status is at baseline.      Gait: Gait normal.   Psychiatric:         Mood and Affect: Mood normal.         Behavior: Behavior normal.         Thought Content: Thought content normal.         Judgment: Judgment normal.     Hospital Outpatient Visit on 11/08/2023   Component Date Value Ref Range Status   • AV pk roderick 11/08/2023 1.34   Final   • AV mn grad 11/08/2023 3.6   Final   • LVOT diam 11/08/2023 1.89   Final   • LV biplane EF 11/08/2023 64   Final   • MV E/A ratio 11/08/2023 0.75   Final   • Tricuspid annular plane systolic e* 11/08/2023 2.0   Final   • RV free wall pk S' 11/08/2023 18.00   Final   • LVIDd 11/08/2023 3.72   Final   • RVSP 11/08/2023 28.2   Final   • Aortic Valve Area by Continuity of* 11/08/2023 1.92   Final   • Aortic Valve Area by Continuity of* 11/08/2023 1.94   Final   • AV pk grad 11/08/2023 7.1   Final   • LV A4C EF 11/08/2023 64.0   Final   Lab on 08/21/2023   Component Date Value Ref Range Status   • Methylmalonic Acid, S 08/21/2023 0.42 (H)  0.00 - 0.40 umol/L Final    Slight elevation 0.41-0.99 umol/L         Consistent with mild vitamin B12 deficiency, renal         insufficiency, or intravascular volume contraction.  Moderate elevation 1.00-9.99 umol/L          Consistent with mild vitamin B12 deficiency.  Massive elevation - Greater than or equal to 10 umol/L          Consistent with significant vitamin B12 deficiency          or with inborn errors of metabolism.  INTERPRETIVE INFORMATION: MMA Serum/Plasma,                             Vitamin B12 Status  This test was developed and its performance characteristics   determined by [facility]. It has not been cleared or   approved by the US Food and Drug Administration. This test was    performed in a CLIA certified laboratory and is intended for   clinical purposes.  Performed By: [facility]  [address] [place], [place] [id number]  : Mynor Peña MD, PhD  CLIA Number: [id number]   • Folate 08/21/2023 18.2  >5.0 ng/mL Final    Low           <3.4  Borderline 3.4-5.0  Normal        >5.0  .   Biotin interference may cause falsely elevated results.    Patients taking a Biotin dose of up to 5 mg/day should    refrain from taking Biotin for 24 hours before sample    collection. Providers may contact their local laboratory   for further information.   Lab on 07/27/2023   Component Date Value Ref Range Status   • WBC 07/27/2023 5.7  4.4 - 11.3 x10E9/L Final   • nRBC 07/27/2023 0.0  0.0 - 0.0 /100 WBC Final   • RBC 07/27/2023 4.10  4.00 - 5.20 x10E12/L Final   • Hemoglobin 07/27/2023 13.6  12.0 - 16.0 g/dL Final   • Hematocrit 07/27/2023 41.9  36.0 - 46.0 % Final   • MCV 07/27/2023 102 (H)  80 - 100 fL Final   • MCHC 07/27/2023 32.5  32.0 - 36.0 g/dL Final   • Platelets 07/27/2023 335  150 - 450 x10E9/L Final   • RDW 07/27/2023 12.1  11.5 - 14.5 % Final   • Neutrophils % 07/27/2023 57.1  40.0 - 80.0 % Final   • Immature Granulocytes %, Automated 07/27/2023 0.2  0.0 - 0.9 % Final     Immature Granulocyte Count (IG) includes promyelocytes,    myelocytes and metamyelocytes but does not include bands.   Percent differential counts (%) should be interpreted in the   context of the absolute cell counts (cells/L).   • Lymphocytes % 07/27/2023 30.5  13.0 - 44.0 % Final   • Monocytes % 07/27/2023 10.2  2.0 - 10.0 % Final   • Eosinophils % 07/27/2023 1.6  0.0 - 6.0 % Final   • Basophils % 07/27/2023 0.4  0.0 - 2.0 % Final   • Neutrophils Absolute 07/27/2023 3.25  1.60 - 5.50 x10E9/L Final   • Lymphocytes Absolute 07/27/2023 1.73  0.80 - 3.00 x10E9/L Final   • Monocytes Absolute 07/27/2023 0.58  0.05 - 0.80 x10E9/L Final   • Eosinophils Absolute 07/27/2023 0.09  0.00 - 0.40  x10E9/L Final   • Basophils Absolute 07/27/2023 0.02  0.00 - 0.10 x10E9/L Final   • Glucose 07/27/2023 132 (H)  74 - 99 mg/dL Final   • Sodium 07/27/2023 141  136 - 145 mmol/L Final   • Potassium 07/27/2023 4.3  3.5 - 5.3 mmol/L Final   • Chloride 07/27/2023 106  98 - 107 mmol/L Final   • Bicarbonate 07/27/2023 22  21 - 32 mmol/L Final   • Anion Gap 07/27/2023 17  10 - 20 mmol/L Final   • Urea Nitrogen 07/27/2023 35 (H)  6 - 23 mg/dL Final   • Creatinine 07/27/2023 0.95  0.50 - 1.05 mg/dL Final   • GFR Female 07/27/2023 59 (A)  >90 mL/min/1.73m2 Final     CALCULATIONS OF ESTIMATED GFR ARE PERFORMED   USING THE 2021 CKD-EPI STUDY REFIT EQUATION   WITHOUT THE RACE VARIABLE FOR THE IDMS-TRACEABLE   CREATININE METHODS.    https://jasn.asnjournals.org/content/early/2021/09/22/ASN.6239987903   • Calcium 07/27/2023 9.8  8.6 - 10.6 mg/dL Final   • Albumin 07/27/2023 4.7  3.4 - 5.0 g/dL Final   • Alkaline Phosphatase 07/27/2023 48  33 - 136 U/L Final   • Total Protein 07/27/2023 7.1  6.4 - 8.2 g/dL Final   • AST 07/27/2023 14  9 - 39 U/L Final   • Total Bilirubin 07/27/2023 0.5  0.0 - 1.2 mg/dL Final   • ALT (SGPT) 07/27/2023 6 (L)  7 - 45 U/L Final     Patients treated with Sulfasalazine may generate    falsely decreased results for ALT.   • Cholesterol 07/27/2023 169  0 - 199 mg/dL Final    .      AGE      DESIRABLE   BORDERLINE HIGH   HIGH     0-19 Y     0 - 169       170 - 199     >/= 200    20-24 Y     0 - 189       190 - 224     >/= 225         >24 Y     0 - 199       200 - 239     >/= 240   **All ranges are based on fasting samples. Specific   therapeutic targets will vary based on patient-specific   cardiac risk.  .   Pediatric guidelines reference:Pediatrics 2011, 128(S5).   Adult guidelines reference: NCEP ATPIII Guidelines,     [place] 2001, 258:2486-97  .   Venipuncture immediately after or during the    administration of Metamizole may lead to falsely   low results. Testing should be performed immediately   prior  to Metamizole dosing.   • HDL 07/27/2023 60.8  mg/dL Final    .      AGE      VERY LOW   LOW     NORMAL    HIGH       0-19 Y       < 35   < 40     40-45     ----    20-24 Y       ----   < 40       >45     ----      >24 Y       ----   < 40     40-60      >60  .   • Cholesterol/HDL Ratio 07/27/2023 2.8   Final    REF VALUES  DESIRABLE  < 3.4  HIGH RISK  > 5.0   • LDL 07/27/2023 94  0 - 99 mg/dL Final    .                           NEAR      BORD      AGE      DESIRABLE  OPTIMAL    HIGH     HIGH     VERY HIGH     0-19 Y     0 - 109     ---    110-129   >/= 130     ----    20-24 Y     0 - 119     ---    120-159   >/= 160     ----      >24 Y     0 -  99   100-129  130-159   160-189     >/=190  .   • VLDL 07/27/2023 14  0 - 40 mg/dL Final   • Triglycerides 07/27/2023 69  0 - 149 mg/dL Final    .      AGE      DESIRABLE   BORDERLINE HIGH   HIGH     VERY HIGH   0 D-90 D    19 - 174         ----         ----        ----  91 D- 9 Y     0 -  74        75 -  99     >/= 100      ----    10-19 Y     0 -  89        90 - 129     >/= 130      ----    20-24 Y     0 - 114       115 - 149     >/= 150      ----         >24 Y     0 - 149       150 - 199    200- 499    >/= 500  .   Venipuncture immediately after or during the    administration of Metamizole may lead to falsely   low results. Testing should be performed immediately   prior to Metamizole dosing.   • TSH 07/27/2023 2.41  0.44 - 3.98 mIU/L Final     TSH testing is performed using different testing    methodology at [facility] than at other    [facility]. Direct result comparisons should    only be made within the same method.   • Vitamin D, 25-Hydroxy 07/27/2023 87  ng/mL Final    .  DEFICIENCY:         < 20   NG/ML  INSUFFICIENCY:      20-29  NG/ML  SUFFICIENCY:         NG/ML    THIS ASSAY ACCURATELY QUANTIFIES THE SUM OF  VITAMIN D3, 25-HYDROXY AND VIT D2,25-HYDROXY.   • Vitamin B-12 07/27/2023 226  211 - 911 pg/mL Final   • Hemoglobin A1C  07/27/2023 5.9 (A)  % Final    Comment:      Diagnosis of Diabetes-Adults   Non-Diabetic: < or = 5.6%   Increased risk for developing diabetes: 5.7-6.4%   Diagnostic of diabetes: > or = 6.5%  .       Monitoring of Diabetes                Age (y)     Therapeutic Goal (%)   Adults:          >18           <7.0   Pediatrics:    13-18           <7.5                   7-12           <8.0                   0- 6            7.5-8.5   American Diabetes Association. Diabetes Care 33(S1), Jan 2010.     • Estimated Average Glucose 07/27/2023 123  MG/DL Final     Current Outpatient Medications on File Prior to Visit   Medication Sig Dispense Refill   • amLODIPine (Norvasc) 5 mg tablet TAKE ONE TABLET BY MOUTH ONCE DAILY. 90 tablet 0   • cholecalciferol (Vitamin D-3) 125 MCG (5000 UT) capsule Take 1 capsule (125 mcg) by mouth once daily.     • DULoxetine (Cymbalta) 60 mg DR capsule TAKE ONE CAPSULE BY MOUTH ONCE DAILY. 90 capsule 0   • magnesium oxide 400 mg magnesium capsule Take 1 capsule (400 mg) by mouth once daily.     • omega 3-dha-epa-fish oil 1,200 (144-216) mg capsule Take 1 capsule (1,200 mg) by mouth once daily.       Current Facility-Administered Medications on File Prior to Visit   Medication Dose Route Frequency Provider Last Rate Last Admin   • Tc-99m tetrofosmin (Myoview) injection 12.5 millicurie  12.5 millicurie intravenous Once in imaging Rafael S Clementine, DO       • Tc-99m tetrofosmin (Myoview) injection 35.5 millicurie  35.5 millicurie intravenous Once in imaging Rafael S Clementine, DO         No images are attached to the encounter.            Assessment/Plan   Diagnoses and all orders for this visit:  Throat irritation  Acute pain of right knee    Patient to use Ocean nasal spray 2 sprays 3 times daily as needed for throat  Patient to consider x-ray of right knee  Patient to call if questions or concerns

## 2023-11-25 LAB
FOLATE SERPL-MCNC: 12.2 NG/ML
VIT B12 SERPL-MCNC: >2000 PG/ML (ref 211–911)

## 2023-11-28 LAB — METHYLMALONATE SERPL-SCNC: 0.34 UMOL/L (ref 0–0.4)

## 2023-11-30 ENCOUNTER — APPOINTMENT (OUTPATIENT)
Dept: PRIMARY CARE | Facility: CLINIC | Age: 83
End: 2023-11-30
Payer: MEDICARE

## 2023-12-01 DIAGNOSIS — G62.9 NEUROPATHY: ICD-10-CM

## 2023-12-02 RX ORDER — DULOXETIN HYDROCHLORIDE 60 MG/1
CAPSULE, DELAYED RELEASE ORAL
Qty: 90 CAPSULE | Refills: 0 | Status: SHIPPED | OUTPATIENT
Start: 2023-12-02 | End: 2024-04-02

## 2023-12-08 ENCOUNTER — TELEPHONE (OUTPATIENT)
Dept: PRIMARY CARE | Facility: CLINIC | Age: 83
End: 2023-12-08
Payer: MEDICARE

## 2023-12-08 NOTE — TELEPHONE ENCOUNTER
Patient finished antibiotic Keflex that was prescribed 11-24, states her throat still hurts and wanted to know what you recommend ?

## 2023-12-12 ENCOUNTER — OFFICE VISIT (OUTPATIENT)
Dept: CARDIOLOGY | Facility: CLINIC | Age: 83
End: 2023-12-12
Payer: MEDICARE

## 2023-12-12 VITALS
BODY MASS INDEX: 21.55 KG/M2 | OXYGEN SATURATION: 98 % | HEART RATE: 79 BPM | DIASTOLIC BLOOD PRESSURE: 63 MMHG | WEIGHT: 109.8 LBS | SYSTOLIC BLOOD PRESSURE: 129 MMHG | HEIGHT: 60 IN

## 2023-12-12 DIAGNOSIS — R07.9 CHEST PAIN, UNSPECIFIED TYPE: ICD-10-CM

## 2023-12-12 DIAGNOSIS — I49.3 PVC'S (PREMATURE VENTRICULAR CONTRACTIONS): ICD-10-CM

## 2023-12-12 DIAGNOSIS — I10 PRIMARY HYPERTENSION: ICD-10-CM

## 2023-12-12 PROCEDURE — 3074F SYST BP LT 130 MM HG: CPT | Performed by: STUDENT IN AN ORGANIZED HEALTH CARE EDUCATION/TRAINING PROGRAM

## 2023-12-12 PROCEDURE — 1036F TOBACCO NON-USER: CPT | Performed by: STUDENT IN AN ORGANIZED HEALTH CARE EDUCATION/TRAINING PROGRAM

## 2023-12-12 PROCEDURE — 1158F ADVNC CARE PLAN TLK DOCD: CPT | Performed by: STUDENT IN AN ORGANIZED HEALTH CARE EDUCATION/TRAINING PROGRAM

## 2023-12-12 PROCEDURE — 1159F MED LIST DOCD IN RCRD: CPT | Performed by: STUDENT IN AN ORGANIZED HEALTH CARE EDUCATION/TRAINING PROGRAM

## 2023-12-12 PROCEDURE — 99213 OFFICE O/P EST LOW 20 MIN: CPT | Performed by: STUDENT IN AN ORGANIZED HEALTH CARE EDUCATION/TRAINING PROGRAM

## 2023-12-12 PROCEDURE — 1160F RVW MEDS BY RX/DR IN RCRD: CPT | Performed by: STUDENT IN AN ORGANIZED HEALTH CARE EDUCATION/TRAINING PROGRAM

## 2023-12-12 PROCEDURE — 3078F DIAST BP <80 MM HG: CPT | Performed by: STUDENT IN AN ORGANIZED HEALTH CARE EDUCATION/TRAINING PROGRAM

## 2023-12-12 ASSESSMENT — ENCOUNTER SYMPTOMS
SYNCOPE: 0
ALLERGIC/IMMUNOLOGIC NEGATIVE: 1
PALPITATIONS: 0
ORTHOPNEA: 0
GASTROINTESTINAL NEGATIVE: 1
ENDOCRINE NEGATIVE: 1
MUSCULOSKELETAL NEGATIVE: 1
RESPIRATORY NEGATIVE: 1
EYES NEGATIVE: 1
PND: 0
NEUROLOGICAL NEGATIVE: 1
PSYCHIATRIC NEGATIVE: 1
CONSTITUTIONAL NEGATIVE: 1

## 2023-12-12 NOTE — PATIENT INSTRUCTIONS
Your stress test and heart ultrasound showed normal heart function. No further heat testing recommended at this time.     We will see you back in ~ 1 year     Thank you for your visit today. Please contact our office (via [email or web address] or phone) with any additional questions.     [facility]    , RN/Clinic Nurse for:    Dr. Go Hernandez    [address], [address]  [place] [address]    Phone: [phone] Press Option 5 then Option 3 to speak with the Clinic Nurse ()    _____    To Reach:    Billing Questions -    [phone]  Scheduling / Rescheduling -  Option 1  Refills / Medication Requests -  Option 3  General Office /  -  Option 4  Results -     Option 6  Medical Records -    Option 7  Repeat Options -    Option 9

## 2023-12-12 NOTE — PROGRESS NOTES
Cardiology New Patient History and Physical    Reason for visit: follow-up regarding chest pain     HPI: Kandice Lobo is a 83 y.o.  female who presents for a follow-up for chest pain, hypertension. Past medical history of HTN, DLD, osteopenia, and arthritis.     Patient presented to cardiology clinic 10/17/2023.  Patient noted chest pain at night last week.  Patient was not exerting herself at the time.  Pain was centrally located, lasted for ~ 1 hour. Patient took 325 of aspirin, pain subsided spontaneously.  Patient was short of breath with episode. No nausea, vomiting or diaphoresis.  Patient has had some family stressors lately.  Patient had multiple risk factors for CAD (HTN, pre-DM, family history of CVD- father). Recommended further evaluation with TTE and exercise stress echo.     Kandice returned to cardiology clinic 2023. Transthoracic echocardiogram showed normal left ventricular ejection fraction, mild concentric LVH, mild diastolic dysfunction.  Nuclear medicine stress (2022) showed normal myocardial perfusion imaging and no ECG evidence for ischemia; left ventricular ejection fraction estimated at 74%.  Patient has no active cardiovascular complaints at this time.  No further episodes of chest pain patient. Kandice is tolerating physical activity without active cardiovascular complaints.    Past Medical History:   - As Above    Surgical History:   She has a past surgical history that includes Shoulder surgery (2015); Back surgery (2015); Other surgical history (2015); and Cataract extraction (11/15/2017).    Family History:   Family History   Problem Relation Name Age of Onset    Kidney cancer Mother      Bone cancer Mother      Dementia Father      Other (SEPTICEMIA) Father       Allergies:  Lisinopril     Social History:   - Never a smoker; no alcohol use; no illicit drug use; 4 sons (1 - drowned; 3 other sons leaving)    Prior Cardiovascular Testing  (personally reviewed):     Pharmacologic NM Stress (11/8/2023):  1. Normal stress myocardial perfusion imaging in response to pharmacologic stress.  2. No ECG evidence of ischemia  3. Well-maintained left ventricular function.  74%    TTE (11/8/2023):   1. Left ventricular systolic function is normal with a 65-70% estimated ejection fraction.   2. Mild concentric LVH.   3. Spectral Doppler shows an impaired relaxation pattern of left ventricular diastolic filling.   4. RVSP within normal limits.    ECG (10/6/2023- reviewed; per my interpretation)- sinus rhythm; PVCs    Lipid Panel (7/27/2023)- total 169, HDL 60, LDL 94, Triglycerides 69 mg/dl    Carotid Duplex (11/20/20217)  - No sonographic evidence of plaque formation in the left cervical carotid arterial system.  - Very mild right carotid bulb intimal thickening. Otherwise, no identifiable right-sided plaque formation.    Review of Systems:  Review of Systems   Constitutional: Negative.   HENT: Negative.     Eyes: Negative.    Cardiovascular:  Negative for chest pain, orthopnea, palpitations, paroxysmal nocturnal dyspnea and syncope.   Respiratory: Negative.     Endocrine: Negative.    Skin: Negative.    Musculoskeletal: Negative.    Gastrointestinal: Negative.    Genitourinary: Negative.    Neurological: Negative.    Psychiatric/Behavioral: Negative.     Allergic/Immunologic: Negative.        Objective     Outpatient Medications:    Current Outpatient Medications:     amLODIPine (Norvasc) 5 mg tablet, TAKE ONE TABLET BY MOUTH ONCE DAILY., Disp: 90 tablet, Rfl: 0    cholecalciferol (Vitamin D-3) 125 MCG (5000 UT) capsule, Take 1 capsule (125 mcg) by mouth once daily., Disp: , Rfl:     DULoxetine (Cymbalta) 60 mg DR capsule, TAKE ONE CAPSULE BY MOUTH ONCE DAILY., Disp: 90 capsule, Rfl: 0    magnesium oxide 400 mg magnesium capsule, Take 1 capsule (400 mg) by mouth once daily., Disp: , Rfl:     omega 3-dha-epa-fish oil 1,200 (144-216) mg capsule, Take 1 capsule  "(1,200 mg) by mouth once daily., Disp: , Rfl:   No current facility-administered medications for this visit.     Last Recorded Vitals  /63 (BP Location: Left arm, Patient Position: Sitting, BP Cuff Size: Adult)   Pulse 79   Ht 1.524 m (5')   Wt 49.8 kg (109 lb 12.8 oz)   SpO2 98%   BMI 21.44 kg/m²     Physical Exam:  Physical Exam  HENT:      Head: Normocephalic and atraumatic.      Mouth/Throat:      Mouth: Mucous membranes are moist.   Eyes:      Extraocular Movements: Extraocular movements intact.      Conjunctiva/sclera: Conjunctivae normal.   Neck:      Vascular: No JVD.   Cardiovascular:      Rate and Rhythm: Normal rate and regular rhythm.      Pulses: Normal pulses.      Heart sounds: No murmur heard.  Pulmonary:      Effort: Pulmonary effort is normal.      Breath sounds: Normal breath sounds.   Abdominal:      General: There is no distension.   Musculoskeletal:      Right lower leg: No edema.      Left lower leg: No edema.   Skin:     General: Skin is warm and dry.   Neurological:      General: No focal deficit present.      Mental Status: She is alert.      Cranial Nerves: No cranial nerve deficit.      Motor: No weakness.   Psychiatric:         Mood and Affect: Mood normal.         Behavior: Behavior normal.         Lab Review:    Lab Results   Component Value Date    GLUCOSE 132 (H) 07/27/2023    CALCIUM 9.8 07/27/2023     07/27/2023    K 4.3 07/27/2023    CO2 22 07/27/2023     07/27/2023    BUN 35 (H) 07/27/2023    CREATININE 0.95 07/27/2023       Lab Results   Component Value Date    WBC 7.2 11/24/2023    HGB 12.9 11/24/2023    HCT 40.2 11/24/2023    MCV 99 11/24/2023     11/24/2023       Lab Results   Component Value Date    CHOL 169 07/27/2023    CHOL 130 10/03/2022    CHOL 185 09/01/2021     Lab Results   Component Value Date    HDL 60.8 07/27/2023    HDL 69.5 10/03/2022    HDL 59.0 09/01/2021     No results found for: \"LDLCALC\"  Lab Results   Component Value Date    " TRIG 69 07/27/2023    TRIG 80 10/03/2022    TRIG 89 09/01/2021       Lab Results   Component Value Date    TSH 2.41 07/27/2023       Assessment:   83 y.o.  female who presents for a follow-up for chest pain, hypertension. Past medical history of HTN, DLD, osteopenia, and arthritis.     Kandice returned to cardiology clinic 12/12/2023. Transthoracic echocardiogram showed normal left ventricular ejection fraction, mild concentric LVH, mild diastolic dysfunction.  Nuclear medicine stress (11/8/2022) showed normal myocardial perfusion imaging and no ECG evidence for ischemia; left ventricular ejection fraction estimated at 74%.  Patient has no active cardiovascular complaints at this time.  No further episodes of chest pain patient. Kandice is tolerating physical activity without active cardiovascular complaints. HTN is well controlled. Continue cardiac risk factor management as below.     Overall Plan:  1. Chest pain, dyspnea (resolved):  - Pharmacologic nuclear medicine stress test (11/2023) was low risk for ischemia; no further testing recommended at this time    2. HTN (goal BP < 130/90 mmHg; well-controlled)- continue amlodipine; if not at goal increase amlodipine to 10 mg daily    3. Dyslipidemia (goal LDL < 70 mg/dl)- continue dietary and lifestyle modifications    Disposition: Return to cardiology clinic as needed    Franc Stiles MD

## 2023-12-12 NOTE — TELEPHONE ENCOUNTER
Patient states that she her throat just hurts, nothing else. Antibiotic was finished.     Wants to know what she can do?

## 2024-01-16 ENCOUNTER — OFFICE VISIT (OUTPATIENT)
Dept: PRIMARY CARE | Facility: CLINIC | Age: 84
End: 2024-01-16
Payer: MEDICARE

## 2024-01-16 VITALS
HEART RATE: 73 BPM | SYSTOLIC BLOOD PRESSURE: 128 MMHG | TEMPERATURE: 96.7 F | WEIGHT: 108.2 LBS | OXYGEN SATURATION: 100 % | DIASTOLIC BLOOD PRESSURE: 58 MMHG | BODY MASS INDEX: 21.13 KG/M2

## 2024-01-16 DIAGNOSIS — J20.9 ACUTE BRONCHITIS, UNSPECIFIED ORGANISM: Primary | ICD-10-CM

## 2024-01-16 PROCEDURE — 87637 SARSCOV2&INF A&B&RSV AMP PRB: CPT

## 2024-01-16 PROCEDURE — 1159F MED LIST DOCD IN RCRD: CPT | Performed by: FAMILY MEDICINE

## 2024-01-16 PROCEDURE — 3078F DIAST BP <80 MM HG: CPT | Performed by: FAMILY MEDICINE

## 2024-01-16 PROCEDURE — 1158F ADVNC CARE PLAN TLK DOCD: CPT | Performed by: FAMILY MEDICINE

## 2024-01-16 PROCEDURE — 1160F RVW MEDS BY RX/DR IN RCRD: CPT | Performed by: FAMILY MEDICINE

## 2024-01-16 PROCEDURE — 3074F SYST BP LT 130 MM HG: CPT | Performed by: FAMILY MEDICINE

## 2024-01-16 PROCEDURE — 99214 OFFICE O/P EST MOD 30 MIN: CPT | Performed by: FAMILY MEDICINE

## 2024-01-16 PROCEDURE — 1036F TOBACCO NON-USER: CPT | Performed by: FAMILY MEDICINE

## 2024-01-16 RX ORDER — AMOXICILLIN 875 MG/1
875 TABLET, FILM COATED ORAL 2 TIMES DAILY
Qty: 20 TABLET | Refills: 0 | Status: SHIPPED | OUTPATIENT
Start: 2024-01-16 | End: 2024-01-26

## 2024-01-16 ASSESSMENT — ENCOUNTER SYMPTOMS
HEADACHES: 0
PALPITATIONS: 0
FATIGUE: 0
BACK PAIN: 0
ARTHRALGIAS: 0
CHILLS: 1
APPETITE CHANGE: 0
LIGHT-HEADEDNESS: 0
CHEST TIGHTNESS: 0
FACIAL ASYMMETRY: 0
FEVER: 1
DIZZINESS: 0
ACTIVITY CHANGE: 0
CHOKING: 0
COUGH: 1
COLOR CHANGE: 0

## 2024-01-16 NOTE — PROGRESS NOTES
Subjective   Patient ID: Kandice Lobo is a 83 y.o. female who presents for Cough with chest congestion. (On and off X December. Did not check for covid. ).    HPI   Patient reports she has had difficulty with cough with chest congestion off and on since beginning of December.  Patient reports that she has not checked for COVID.  Denies any nausea vomiting diarrhea constipation denies any chest pain shortness of breath syncopal episodes or palpitations.  Review of Systems   Constitutional:  Positive for chills and fever. Negative for activity change, appetite change and fatigue.   HENT:  Negative for congestion.    Respiratory:  Positive for cough. Negative for choking and chest tightness.    Cardiovascular:  Negative for chest pain, palpitations and leg swelling.   Musculoskeletal:  Negative for arthralgias, back pain and gait problem.   Skin:  Negative for color change and pallor.   Neurological:  Negative for dizziness, facial asymmetry, light-headedness and headaches.       Objective   /58 (BP Location: Left arm)   Pulse 73   Temp 35.9 °C (96.7 °F)   Wt 49.1 kg (108 lb 3.2 oz)   SpO2 100%   BMI 21.13 kg/m²   BSA Body surface area is 1.44 meters squared.      Physical Exam  Constitutional:       General: She is not in acute distress.     Appearance: Normal appearance. She is not toxic-appearing.   HENT:      Head: Normocephalic.      Right Ear: Tympanic membrane, ear canal and external ear normal.      Left Ear: Tympanic membrane, ear canal and external ear normal.   Eyes:      Conjunctiva/sclera: Conjunctivae normal.      Pupils: Pupils are equal, round, and reactive to light.   Cardiovascular:      Rate and Rhythm: Normal rate and regular rhythm.      Pulses: Normal pulses.      Heart sounds: Normal heart sounds.   Pulmonary:      Effort: No respiratory distress.      Breath sounds: No wheezing, rhonchi or rales.   Musculoskeletal:         General: No swelling or tenderness.   Skin:     Findings:  No lesion or rash.   Neurological:      General: No focal deficit present.      Mental Status: She is alert and oriented to person, place, and time. Mental status is at baseline.      Gait: Gait normal.   Psychiatric:         Mood and Affect: Mood normal.         Behavior: Behavior normal.         Thought Content: Thought content normal.         Judgment: Judgment normal.       Lab on 11/24/2023   Component Date Value Ref Range Status    WBC 11/24/2023 7.2  4.4 - 11.3 x10*3/uL Final    nRBC 11/24/2023 0.0  0.0 - 0.0 /100 WBCs Final    RBC 11/24/2023 4.07  4.00 - 5.20 x10*6/uL Final    Hemoglobin 11/24/2023 12.9  12.0 - 16.0 g/dL Final    Hematocrit 11/24/2023 40.2  36.0 - 46.0 % Final    MCV 11/24/2023 99  80 - 100 fL Final    MCH 11/24/2023 31.7  26.0 - 34.0 pg Final    MCHC 11/24/2023 32.1  32.0 - 36.0 g/dL Final    RDW 11/24/2023 12.5  11.5 - 14.5 % Final    Platelets 11/24/2023 364  150 - 450 x10*3/uL Final    Neutrophils % 11/24/2023 62.5  40.0 - 80.0 % Final    Immature Granulocytes %, Automated 11/24/2023 0.3  0.0 - 0.9 % Final    Immature Granulocyte Count (IG) includes promyelocytes, myelocytes and metamyelocytes but does not include bands. Percent differential counts (%) should be interpreted in the context of the absolute cell counts (cells/UL).    Lymphocytes % 11/24/2023 26.6  13.0 - 44.0 % Final    Monocytes % 11/24/2023 8.4  2.0 - 10.0 % Final    Eosinophils % 11/24/2023 1.8  0.0 - 6.0 % Final    Basophils % 11/24/2023 0.4  0.0 - 2.0 % Final    Neutrophils Absolute 11/24/2023 4.48  1.60 - 5.50 x10*3/uL Final    Percent differential counts (%) should be interpreted in the context of the absolute cell counts (cells/uL).    Immature Granulocytes Absolute, Au* 11/24/2023 0.02  0.00 - 0.50 x10*3/uL Final    Lymphocytes Absolute 11/24/2023 1.91  0.80 - 3.00 x10*3/uL Final    Monocytes Absolute 11/24/2023 0.60  0.05 - 0.80 x10*3/uL Final    Eosinophils Absolute 11/24/2023 0.13  0.00 - 0.40 x10*3/uL Final     Basophils Absolute 11/24/2023 0.03  0.00 - 0.10 x10*3/uL Final    Vitamin B12 11/24/2023 >2,000 (H)  211 - 911 pg/mL Final    Methylmalonic Acid, S 11/24/2023 0.34  0.00 - 0.40 umol/L Final    INTERPRETIVE INFORMATION: MMA Serum/Plasma,                             Vitamin B12 Status    This test was developed and its performance characteristics   determined by [facility]. It has not been cleared or   approved by the US Food and Drug Administration. This test was   performed in a CLIA certified laboratory and is intended for   clinical purposes.  Performed By: [facility]  [address] [place] [id number]  : Mynor Peña MD, PhD  CLIA Number: [id number]    Folate, Serum 11/24/2023 12.2  >5.0 ng/mL Final   Hospital Outpatient Visit on 11/08/2023   Component Date Value Ref Range Status    AV pk roderick 11/08/2023 1.34   Final    AV mn grad 11/08/2023 3.6   Final    LVOT diam 11/08/2023 1.89   Final    LV biplane EF 11/08/2023 64   Final    MV E/A ratio 11/08/2023 0.75   Final    Tricuspid annular plane systolic e* 11/08/2023 2.0   Final    RV free wall pk S' 11/08/2023 18.00   Final    LVIDd 11/08/2023 3.72   Final    RVSP 11/08/2023 28.2   Final    Aortic Valve Area by Continuity of* 11/08/2023 1.92   Final    Aortic Valve Area by Continuity of* 11/08/2023 1.94   Final    AV pk grad 11/08/2023 7.1   Final    LV A4C EF 11/08/2023 64.0   Final   Lab on 08/21/2023   Component Date Value Ref Range Status    Methylmalonic Acid, S 08/21/2023 0.42 (H)  0.00 - 0.40 umol/L Final    Slight elevation 0.41-0.99 umol/L         Consistent with mild vitamin B12 deficiency, renal         insufficiency, or intravascular volume contraction.  Moderate elevation 1.00-9.99 umol/L          Consistent with mild vitamin B12 deficiency.  Massive elevation - Greater than or equal to 10 umol/L          Consistent with significant vitamin B12 deficiency          or with inborn errors of  metabolism.  INTERPRETIVE INFORMATION: MMA Serum/Plasma,                             Vitamin B12 Status  This test was developed and its performance characteristics   determined by [facility]. It has not been cleared or   approved by the US Food and Drug Administration. This test was   performed in a CLIA certified laboratory and is intended for   clinical purposes.  Performed By: [facility]  [address] [place] [id number]  : Mynor Peña MD, PhD  CLIA Number: [id number]    Folate 08/21/2023 18.2  >5.0 ng/mL Final    Low           <3.4  Borderline 3.4-5.0  Normal        >5.0  .   Biotin interference may cause falsely elevated results.    Patients taking a Biotin dose of up to 5 mg/day should    refrain from taking Biotin for 24 hours before sample    collection. Providers may contact their local laboratory   for further information.   Lab on 07/27/2023   Component Date Value Ref Range Status    WBC 07/27/2023 5.7  4.4 - 11.3 x10E9/L Final    nRBC 07/27/2023 0.0  0.0 - 0.0 /100 WBC Final    RBC 07/27/2023 4.10  4.00 - 5.20 x10E12/L Final    Hemoglobin 07/27/2023 13.6  12.0 - 16.0 g/dL Final    Hematocrit 07/27/2023 41.9  36.0 - 46.0 % Final    MCV 07/27/2023 102 (H)  80 - 100 fL Final    MCHC 07/27/2023 32.5  32.0 - 36.0 g/dL Final    Platelets 07/27/2023 335  150 - 450 x10E9/L Final    RDW 07/27/2023 12.1  11.5 - 14.5 % Final    Neutrophils % 07/27/2023 57.1  40.0 - 80.0 % Final    Immature Granulocytes %, Automated 07/27/2023 0.2  0.0 - 0.9 % Final     Immature Granulocyte Count (IG) includes promyelocytes,    myelocytes and metamyelocytes but does not include bands.   Percent differential counts (%) should be interpreted in the   context of the absolute cell counts (cells/L).    Lymphocytes % 07/27/2023 30.5  13.0 - 44.0 % Final    Monocytes % 07/27/2023 10.2  2.0 - 10.0 % Final    Eosinophils % 07/27/2023 1.6  0.0 - 6.0 % Final    Basophils % 07/27/2023 0.4  0.0  - 2.0 % Final    Neutrophils Absolute 07/27/2023 3.25  1.60 - 5.50 x10E9/L Final    Lymphocytes Absolute 07/27/2023 1.73  0.80 - 3.00 x10E9/L Final    Monocytes Absolute 07/27/2023 0.58  0.05 - 0.80 x10E9/L Final    Eosinophils Absolute 07/27/2023 0.09  0.00 - 0.40 x10E9/L Final    Basophils Absolute 07/27/2023 0.02  0.00 - 0.10 x10E9/L Final    Glucose 07/27/2023 132 (H)  74 - 99 mg/dL Final    Sodium 07/27/2023 141  136 - 145 mmol/L Final    Potassium 07/27/2023 4.3  3.5 - 5.3 mmol/L Final    Chloride 07/27/2023 106  98 - 107 mmol/L Final    Bicarbonate 07/27/2023 22  21 - 32 mmol/L Final    Anion Gap 07/27/2023 17  10 - 20 mmol/L Final    Urea Nitrogen 07/27/2023 35 (H)  6 - 23 mg/dL Final    Creatinine 07/27/2023 0.95  0.50 - 1.05 mg/dL Final    GFR Female 07/27/2023 59 (A)  >90 mL/min/1.73m2 Final     CALCULATIONS OF ESTIMATED GFR ARE PERFORMED   USING THE 2021 CKD-EPI STUDY REFIT EQUATION   WITHOUT THE RACE VARIABLE FOR THE IDMS-TRACEABLE   CREATININE METHODS.    https://jasn.asnjournals.org/content/early/2021/09/22/ASN.0700026802    Calcium 07/27/2023 9.8  8.6 - 10.6 mg/dL Final    Albumin 07/27/2023 4.7  3.4 - 5.0 g/dL Final    Alkaline Phosphatase 07/27/2023 48  33 - 136 U/L Final    Total Protein 07/27/2023 7.1  6.4 - 8.2 g/dL Final    AST 07/27/2023 14  9 - 39 U/L Final    Total Bilirubin 07/27/2023 0.5  0.0 - 1.2 mg/dL Final    ALT (SGPT) 07/27/2023 6 (L)  7 - 45 U/L Final     Patients treated with Sulfasalazine may generate    falsely decreased results for ALT.    Cholesterol 07/27/2023 169  0 - 199 mg/dL Final    .      AGE      DESIRABLE   BORDERLINE HIGH   HIGH     0-19 Y     0 - 169       170 - 199     >/= 200    20-24 Y     0 - 189       190 - 224     >/= 225         >24 Y     0 - 199       200 - 239     >/= 240   **All ranges are based on fasting samples. Specific   therapeutic targets will vary based on patient-specific   cardiac risk.  .   Pediatric guidelines reference:Pediatrics 2011,  128(S5).   Adult guidelines reference: NCEP ATPIII Guidelines,     SALE[place] 2001, 258:2486-97  .   Venipuncture immediately after or during the    administration of Metamizole may lead to falsely   low results. Testing should be performed immediately   prior to Metamizole dosing.    HDL 07/27/2023 60.8  mg/dL Final    .      AGE      VERY LOW   LOW     NORMAL    HIGH       0-19 Y       < 35   < 40     40-45     ----    20-24 Y       ----   < 40       >45     ----      >24 Y       ----   < 40     40-60      >60  .    Cholesterol/HDL Ratio 07/27/2023 2.8   Final    REF VALUES  DESIRABLE  < 3.4  HIGH RISK  > 5.0    LDL 07/27/2023 94  0 - 99 mg/dL Final    .                           NEAR      BORD      AGE      DESIRABLE  OPTIMAL    HIGH     HIGH     VERY HIGH     0-19 Y     0 - 109     ---    110-129   >/= 130     ----    20-24 Y     0 - 119     ---    120-159   >/= 160     ----      >24 Y     0 -  99   100-129  130-159   160-189     >/=190  .    VLDL 07/27/2023 14  0 - 40 mg/dL Final    Triglycerides 07/27/2023 69  0 - 149 mg/dL Final    .      AGE      DESIRABLE   BORDERLINE HIGH   HIGH     VERY HIGH   0 D-90 D    19 - 174         ----         ----        ----  91 D- 9 Y     0 -  74        75 -  99     >/= 100      ----    10-19 Y     0 -  89        90 - 129     >/= 130      ----    20-24 Y     0 - 114       115 - 149     >/= 150      ----         >24 Y     0 - 149       150 - 199    200- 499    >/= 500  .   Venipuncture immediately after or during the    administration of Metamizole may lead to falsely   low results. Testing should be performed immediately   prior to Metamizole dosing.    TSH 07/27/2023 2.41  0.44 - 3.98 mIU/L Final     TSH testing is performed using different testing    methodology at [facility] than at other    [facility]. Direct result comparisons should    only be made within the same method.    Vitamin D, 25-Hydroxy 07/27/2023 87  ng/mL Final    .  DEFICIENCY:         < 20    NG/ML  INSUFFICIENCY:      20-29  NG/ML  SUFFICIENCY:         NG/ML    THIS ASSAY ACCURATELY QUANTIFIES THE SUM OF  VITAMIN D3, 25-HYDROXY AND VIT D2,25-HYDROXY.    Vitamin B-12 07/27/2023 226  211 - 911 pg/mL Final    Hemoglobin A1C 07/27/2023 5.9 (A)  % Final    Comment:      Diagnosis of Diabetes-Adults   Non-Diabetic: < or = 5.6%   Increased risk for developing diabetes: 5.7-6.4%   Diagnostic of diabetes: > or = 6.5%  .       Monitoring of Diabetes                Age (y)     Therapeutic Goal (%)   Adults:          >18           <7.0   Pediatrics:    13-18           <7.5                   7-12           <8.0                   0- 6            7.5-8.5   American Diabetes Association. Diabetes Care 33(S1), Jan 2010.      Estimated Average Glucose 07/27/2023 123  MG/DL Final     Current Outpatient Medications on File Prior to Visit   Medication Sig Dispense Refill    amLODIPine (Norvasc) 5 mg tablet TAKE ONE TABLET BY MOUTH ONCE DAILY. 90 tablet 0    cholecalciferol (Vitamin D-3) 125 MCG (5000 UT) capsule Take 1 capsule (125 mcg) by mouth once daily.      DULoxetine (Cymbalta) 60 mg DR capsule TAKE ONE CAPSULE BY MOUTH ONCE DAILY. 90 capsule 0    magnesium oxide 400 mg magnesium capsule Take 1 capsule (400 mg) by mouth once daily.      omega 3-dha-epa-fish oil 1,200 (144-216) mg capsule Take 1 capsule (1,200 mg) by mouth once daily.       No current facility-administered medications on file prior to visit.     No images are attached to the encounter.            Assessment/Plan   Diagnoses and all orders for this visit:  Acute bronchitis, unspecified organism  -     RSV PCR; Future  -     Sars-CoV-2 PCR, Symptomatic; Future  -     Influenza A, and B PCR; Future  -     amoxicillin (Amoxil) 875 mg tablet; Take 1 tablet (875 mg) by mouth 2 times a day for 10 days.    1.  Patient started on antibiotics  2.  Patient to call for questions or concerns

## 2024-01-17 DIAGNOSIS — U07.1 COVID-19: Primary | ICD-10-CM

## 2024-01-17 LAB
FLUAV RNA RESP QL NAA+PROBE: NOT DETECTED
FLUBV RNA RESP QL NAA+PROBE: NOT DETECTED
RSV RNA RESP QL NAA+PROBE: NOT DETECTED
SARS-COV-2 RNA RESP QL NAA+PROBE: DETECTED

## 2024-01-28 DIAGNOSIS — I10 PRIMARY HYPERTENSION: ICD-10-CM

## 2024-01-29 RX ORDER — AMLODIPINE BESYLATE 5 MG/1
TABLET ORAL
Qty: 90 TABLET | Refills: 0 | Status: SHIPPED | OUTPATIENT
Start: 2024-01-29 | End: 2024-05-25 | Stop reason: SDUPTHER

## 2024-03-06 ENCOUNTER — TELEPHONE (OUTPATIENT)
Dept: PRIMARY CARE | Facility: CLINIC | Age: 84
End: 2024-03-06
Payer: MEDICARE

## 2024-03-06 NOTE — TELEPHONE ENCOUNTER
Please schedule patient for [facility] wellness visit in Oct 2024. Schedule with Dr. Giang Please send this encounter to Dr. Giang for lab orders once scheduled.     Thank you-  Dejan Sargent CMA  3/6/2024  Practice Supervisor  [facility]

## 2024-03-07 ENCOUNTER — TELEPHONE (OUTPATIENT)
Dept: PRIMARY CARE | Facility: CLINIC | Age: 84
End: 2024-03-07
Payer: MEDICARE

## 2024-03-07 NOTE — TELEPHONE ENCOUNTER
Patient called and stated that when she went to sleep the night of 3/6 and woke up, her speech was slow and you could not hear her, also the patient complained of her not being able to walk, she stated that it took approximately 3 hour to get her faculties back  to normal. On this day 3/7 she stated that she has no issues with walking or her speech.  Patient stated that she has a family history of strokes, and wanted to speak to Dr. Giang of what she needed to do to prevent these episodes.

## 2024-03-11 ENCOUNTER — PATIENT OUTREACH (OUTPATIENT)
Dept: CARE COORDINATION | Facility: CLINIC | Age: 84
End: 2024-03-11
Payer: MEDICARE

## 2024-03-11 ENCOUNTER — DOCUMENTATION (OUTPATIENT)
Dept: CARE COORDINATION | Facility: CLINIC | Age: 84
End: 2024-03-11
Payer: MEDICARE

## 2024-03-11 DIAGNOSIS — G45.9 TIA (TRANSIENT ISCHEMIC ATTACK): ICD-10-CM

## 2024-03-11 NOTE — PROGRESS NOTES
Discharge Facility:[facility]  Discharge Diagnosis:TIA  Admission Date:3.8.24  Discharge Date: 3.9.24    PCP Appointment Date:not schekduled, message office  Specialist Appointment Date:   Hospital Encounter and Summary: Linked   2 missed calls

## 2024-03-19 DIAGNOSIS — I10 PRIMARY HYPERTENSION: ICD-10-CM

## 2024-03-19 DIAGNOSIS — R73.02 GLUCOSE INTOLERANCE (IMPAIRED GLUCOSE TOLERANCE): ICD-10-CM

## 2024-03-19 DIAGNOSIS — E55.9 VITAMIN D DEFICIENCY: ICD-10-CM

## 2024-03-26 ENCOUNTER — OFFICE VISIT (OUTPATIENT)
Dept: PRIMARY CARE | Facility: CLINIC | Age: 84
End: 2024-03-26
Payer: MEDICARE

## 2024-03-26 VITALS
OXYGEN SATURATION: 98 % | DIASTOLIC BLOOD PRESSURE: 65 MMHG | TEMPERATURE: 97.8 F | RESPIRATION RATE: 16 BRPM | WEIGHT: 111 LBS | BODY MASS INDEX: 21.68 KG/M2 | SYSTOLIC BLOOD PRESSURE: 128 MMHG | HEART RATE: 73 BPM

## 2024-03-26 DIAGNOSIS — R26.81 GAIT INSTABILITY: ICD-10-CM

## 2024-03-26 DIAGNOSIS — Z09 HOSPITAL DISCHARGE FOLLOW-UP: Primary | ICD-10-CM

## 2024-03-26 DIAGNOSIS — G45.9 TIA (TRANSIENT ISCHEMIC ATTACK): ICD-10-CM

## 2024-03-26 PROCEDURE — 99214 OFFICE O/P EST MOD 30 MIN: CPT | Performed by: NURSE PRACTITIONER

## 2024-03-26 PROCEDURE — 3074F SYST BP LT 130 MM HG: CPT | Performed by: NURSE PRACTITIONER

## 2024-03-26 PROCEDURE — 1160F RVW MEDS BY RX/DR IN RCRD: CPT | Performed by: NURSE PRACTITIONER

## 2024-03-26 PROCEDURE — 1036F TOBACCO NON-USER: CPT | Performed by: NURSE PRACTITIONER

## 2024-03-26 PROCEDURE — 3078F DIAST BP <80 MM HG: CPT | Performed by: NURSE PRACTITIONER

## 2024-03-26 PROCEDURE — 1159F MED LIST DOCD IN RCRD: CPT | Performed by: NURSE PRACTITIONER

## 2024-03-26 ASSESSMENT — PATIENT HEALTH QUESTIONNAIRE - PHQ9
10. IF YOU CHECKED OFF ANY PROBLEMS, HOW DIFFICULT HAVE THESE PROBLEMS MADE IT FOR YOU TO DO YOUR WORK, TAKE CARE OF THINGS AT HOME, OR GET ALONG WITH OTHER PEOPLE: NOT DIFFICULT AT ALL
SUM OF ALL RESPONSES TO PHQ9 QUESTIONS 1 AND 2: 0
1. LITTLE INTEREST OR PLEASURE IN DOING THINGS: NOT AT ALL
2. FEELING DOWN, DEPRESSED OR HOPELESS: NOT AT ALL

## 2024-03-26 ASSESSMENT — ENCOUNTER SYMPTOMS
CHILLS: 0
NAUSEA: 0
LOSS OF SENSATION IN FEET: 0
COUGH: 0
SHORTNESS OF BREATH: 0
DEPRESSION: 0
FEVER: 0
VOMITING: 0
DIARRHEA: 0
ABDOMINAL PAIN: 0
OCCASIONAL FEELINGS OF UNSTEADINESS: 0
DECREASED CONCENTRATION: 0

## 2024-03-26 NOTE — PATIENT INSTRUCTIONS
Referral being placed to physical therapy  Start taking aspirin 81mg - take daily with food   Continue medication as prescribed  Follow up for annual wellness visit as scheduled   Obtain fasting blood work as ordered prior to your visit

## 2024-03-26 NOTE — PROGRESS NOTES
Subjective   Chief Complaint: Follow-up ([facility] 3/8/24-3/9/24 for TIA).    HPI   Kandice Lobo is a 84 y.o. female who presents for Follow-up ([facility] 3/8/24-3/9/24 for TIA).    Patient presented to ER on 3/8/2024 with stroke like symptoms: garbled speech. She was admitted into observation unit to monitor condition.   Ct can of head was negative for acute findings. CTA of head and neck showed mild narrowing internal arteries. In the observation unit had an MRI of brain that showed no signs of stroke.   Cholesterol was assessed and adequate  HA1C was pending at time of discharge,     Ha1c 5.7      Patient reports feeling well today.   She had been using a walker since her hospitalization and has now switched back to a cane and mobility is better. She still has some unsteady gait and is agreeable to PT referral to prevent falls.     She is interested in restarting her aspirin 81mg. She previously stopped this due to bruising.     Patient denies fever, chills, nausea, vomiting, diarrhea, chest pain, heart palpations, or shortness of breath.       Review of Systems   Constitutional:  Negative for chills and fever.   Respiratory:  Negative for cough and shortness of breath.    Cardiovascular:  Negative for chest pain.   Gastrointestinal:  Negative for abdominal pain, diarrhea, nausea and vomiting.   Psychiatric/Behavioral:  Negative for decreased concentration.        Objective   /65 (BP Location: Left arm, Patient Position: Sitting, BP Cuff Size: Adult)   Pulse 73   Temp 36.6 °C (97.8 °F)   Resp 16   Wt 50.3 kg (111 lb)   LMP  (LMP Unknown)   SpO2 98%   BMI 21.68 kg/m²   BSA Body surface area is 1.46 meters squared.      Physical Exam  Constitutional:       Appearance: Normal appearance.   Cardiovascular:      Rate and Rhythm: Normal rate and regular rhythm.   Pulmonary:      Effort: Pulmonary effort is normal.      Breath sounds: Normal breath sounds.    Abdominal:      General: Abdomen is flat.      Palpations: Abdomen is soft.   Neurological:      General: No focal deficit present.      Mental Status: She is alert and oriented to person, place, and time.   Psychiatric:         Mood and Affect: Mood normal.       Office Visit on 01/16/2024   Component Date Value Ref Range Status    Flu A Result 01/16/2024 Not Detected  Not Detected Final    Flu B Result 01/16/2024 Not Detected  Not Detected Final    RSV PCR 01/16/2024 Not Detected  Not Detected Final    Coronavirus 2019, PCR 01/16/2024 Detected (A)  Not Detected Final   Lab on 11/24/2023   Component Date Value Ref Range Status    WBC 11/24/2023 7.2  4.4 - 11.3 x10*3/uL Final    nRBC 11/24/2023 0.0  0.0 - 0.0 /100 WBCs Final    RBC 11/24/2023 4.07  4.00 - 5.20 x10*6/uL Final    Hemoglobin 11/24/2023 12.9  12.0 - 16.0 g/dL Final    Hematocrit 11/24/2023 40.2  36.0 - 46.0 % Final    MCV 11/24/2023 99  80 - 100 fL Final    MCH 11/24/2023 31.7  26.0 - 34.0 pg Final    MCHC 11/24/2023 32.1  32.0 - 36.0 g/dL Final    RDW 11/24/2023 12.5  11.5 - 14.5 % Final    Platelets 11/24/2023 364  150 - 450 x10*3/uL Final    Neutrophils % 11/24/2023 62.5  40.0 - 80.0 % Final    Immature Granulocytes %, Automated 11/24/2023 0.3  0.0 - 0.9 % Final    Immature Granulocyte Count (IG) includes promyelocytes, myelocytes and metamyelocytes but does not include bands. Percent differential counts (%) should be interpreted in the context of the absolute cell counts (cells/UL).    Lymphocytes % 11/24/2023 26.6  13.0 - 44.0 % Final    Monocytes % 11/24/2023 8.4  2.0 - 10.0 % Final    Eosinophils % 11/24/2023 1.8  0.0 - 6.0 % Final    Basophils % 11/24/2023 0.4  0.0 - 2.0 % Final    Neutrophils Absolute 11/24/2023 4.48  1.60 - 5.50 x10*3/uL Final    Percent differential counts (%) should be interpreted in the context of the absolute cell counts (cells/uL).    Immature Granulocytes Absolute, Au* 11/24/2023 0.02  0.00 - 0.50 x10*3/uL Final     Lymphocytes Absolute 11/24/2023 1.91  0.80 - 3.00 x10*3/uL Final    Monocytes Absolute 11/24/2023 0.60  0.05 - 0.80 x10*3/uL Final    Eosinophils Absolute 11/24/2023 0.13  0.00 - 0.40 x10*3/uL Final    Basophils Absolute 11/24/2023 0.03  0.00 - 0.10 x10*3/uL Final    Vitamin B12 11/24/2023 >2,000 (H)  211 - 911 pg/mL Final    Methylmalonic Acid, S 11/24/2023 0.34  0.00 - 0.40 umol/L Final    INTERPRETIVE INFORMATION: MMA Serum/Plasma,                             Vitamin B12 Status    This test was developed and its performance characteristics   determined by [facility]. It has not been cleared or   approved by the US Food and Drug Administration. This test was   performed in a CLIA certified laboratory and is intended for   clinical purposes.  Performed By: [facility]  [address] [place] [id number]  : Mynor Peña MD, PhD  CLIA Number: [id number]    Folate, Serum 11/24/2023 12.2  >5.0 ng/mL Final   Hospital Outpatient Visit on 11/08/2023   Component Date Value Ref Range Status    AV pk roderick 11/08/2023 1.34   Final    AV mn grad 11/08/2023 3.6   Final    LVOT diam 11/08/2023 1.89   Final    LV biplane EF 11/08/2023 64   Final    MV E/A ratio 11/08/2023 0.75   Final    Tricuspid annular plane systolic e* 11/08/2023 2.0   Final    RV free wall pk S' 11/08/2023 18.00   Final    LVIDd 11/08/2023 3.72   Final    RVSP 11/08/2023 28.2   Final    Aortic Valve Area by Continuity of* 11/08/2023 1.92   Final    Aortic Valve Area by Continuity of* 11/08/2023 1.94   Final    AV pk grad 11/08/2023 7.1   Final    LV A4C EF 11/08/2023 64.0   Final   Lab on 08/21/2023   Component Date Value Ref Range Status    Methylmalonic Acid, S 08/21/2023 0.42 (H)  0.00 - 0.40 umol/L Final    Slight elevation 0.41-0.99 umol/L         Consistent with mild vitamin B12 deficiency, renal         insufficiency, or intravascular volume contraction.  Moderate elevation 1.00-9.99 umol/L           Consistent with mild vitamin B12 deficiency.  Massive elevation - Greater than or equal to 10 umol/L          Consistent with significant vitamin B12 deficiency          or with inborn errors of metabolism.  INTERPRETIVE INFORMATION: MMA Serum/Plasma,                             Vitamin B12 Status  This test was developed and its performance characteristics   determined by [facility]. It has not been cleared or   approved by the US Food and Drug Administration. This test was   performed in a CLIA certified laboratory and is intended for   clinical purposes.  Performed By: [facility]  [address] [place] [id number]  : Mynor Peña MD, PhD  CLIA Number: [id number]    Folate 08/21/2023 18.2  >5.0 ng/mL Final    Low           <3.4  Borderline 3.4-5.0  Normal        >5.0  .   Biotin interference may cause falsely elevated results.    Patients taking a Biotin dose of up to 5 mg/day should    refrain from taking Biotin for 24 hours before sample    collection. Providers may contact their local laboratory   for further information.   Lab on 07/27/2023   Component Date Value Ref Range Status    WBC 07/27/2023 5.7  4.4 - 11.3 x10E9/L Final    nRBC 07/27/2023 0.0  0.0 - 0.0 /100 WBC Final    RBC 07/27/2023 4.10  4.00 - 5.20 x10E12/L Final    Hemoglobin 07/27/2023 13.6  12.0 - 16.0 g/dL Final    Hematocrit 07/27/2023 41.9  36.0 - 46.0 % Final    MCV 07/27/2023 102 (H)  80 - 100 fL Final    MCHC 07/27/2023 32.5  32.0 - 36.0 g/dL Final    Platelets 07/27/2023 335  150 - 450 x10E9/L Final    RDW 07/27/2023 12.1  11.5 - 14.5 % Final    Neutrophils % 07/27/2023 57.1  40.0 - 80.0 % Final    Immature Granulocytes %, Automated 07/27/2023 0.2  0.0 - 0.9 % Final     Immature Granulocyte Count (IG) includes promyelocytes,    myelocytes and metamyelocytes but does not include bands.   Percent differential counts (%) should be interpreted in the   context of the absolute cell counts  (cells/L).    Lymphocytes % 07/27/2023 30.5  13.0 - 44.0 % Final    Monocytes % 07/27/2023 10.2  2.0 - 10.0 % Final    Eosinophils % 07/27/2023 1.6  0.0 - 6.0 % Final    Basophils % 07/27/2023 0.4  0.0 - 2.0 % Final    Neutrophils Absolute 07/27/2023 3.25  1.60 - 5.50 x10E9/L Final    Lymphocytes Absolute 07/27/2023 1.73  0.80 - 3.00 x10E9/L Final    Monocytes Absolute 07/27/2023 0.58  0.05 - 0.80 x10E9/L Final    Eosinophils Absolute 07/27/2023 0.09  0.00 - 0.40 x10E9/L Final    Basophils Absolute 07/27/2023 0.02  0.00 - 0.10 x10E9/L Final    Glucose 07/27/2023 132 (H)  74 - 99 mg/dL Final    Sodium 07/27/2023 141  136 - 145 mmol/L Final    Potassium 07/27/2023 4.3  3.5 - 5.3 mmol/L Final    Chloride 07/27/2023 106  98 - 107 mmol/L Final    Bicarbonate 07/27/2023 22  21 - 32 mmol/L Final    Anion Gap 07/27/2023 17  10 - 20 mmol/L Final    Urea Nitrogen 07/27/2023 35 (H)  6 - 23 mg/dL Final    Creatinine 07/27/2023 0.95  0.50 - 1.05 mg/dL Final    GFR Female 07/27/2023 59 (A)  >90 mL/min/1.73m2 Final     CALCULATIONS OF ESTIMATED GFR ARE PERFORMED   USING THE 2021 CKD-EPI STUDY REFIT EQUATION   WITHOUT THE RACE VARIABLE FOR THE IDMS-TRACEABLE   CREATININE METHODS.    https://jasn.asnjournals.org/content/early/2021/09/22/ASN.0342133043    Calcium 07/27/2023 9.8  8.6 - 10.6 mg/dL Final    Albumin 07/27/2023 4.7  3.4 - 5.0 g/dL Final    Alkaline Phosphatase 07/27/2023 48  33 - 136 U/L Final    Total Protein 07/27/2023 7.1  6.4 - 8.2 g/dL Final    AST 07/27/2023 14  9 - 39 U/L Final    Total Bilirubin 07/27/2023 0.5  0.0 - 1.2 mg/dL Final    ALT (SGPT) 07/27/2023 6 (L)  7 - 45 U/L Final     Patients treated with Sulfasalazine may generate    falsely decreased results for ALT.    Cholesterol 07/27/2023 169  0 - 199 mg/dL Final    .      AGE      DESIRABLE   BORDERLINE HIGH   HIGH     0-19 Y     0 - 169       170 - 199     >/= 200    20-24 Y     0 - 189       190 - 224     >/= 225         >24 Y     0 - 199       200 - 239      >/= 240   **All ranges are based on fasting samples. Specific   therapeutic targets will vary based on patient-specific   cardiac risk.  .   Pediatric guidelines reference:Pediatrics 2011, 128(S5).   Adult guidelines reference: NCEP ATPIII Guidelines,     [place] 2001, 258:2486-97  .   Venipuncture immediately after or during the    administration of Metamizole may lead to falsely   low results. Testing should be performed immediately   prior to Metamizole dosing.    HDL 07/27/2023 60.8  mg/dL Final    .      AGE      VERY LOW   LOW     NORMAL    HIGH       0-19 Y       < 35   < 40     40-45     ----    20-24 Y       ----   < 40       >45     ----      >24 Y       ----   < 40     40-60      >60  .    Cholesterol/HDL Ratio 07/27/2023 2.8   Final    REF VALUES  DESIRABLE  < 3.4  HIGH RISK  > 5.0    LDL 07/27/2023 94  0 - 99 mg/dL Final    .                           NEAR      BORD      AGE      DESIRABLE  OPTIMAL    HIGH     HIGH     VERY HIGH     0-19 Y     0 - 109     ---    110-129   >/= 130     ----    20-24 Y     0 - 119     ---    120-159   >/= 160     ----      >24 Y     0 -  99   100-129  130-159   160-189     >/=190  .    VLDL 07/27/2023 14  0 - 40 mg/dL Final    Triglycerides 07/27/2023 69  0 - 149 mg/dL Final    .      AGE      DESIRABLE   BORDERLINE HIGH   HIGH     VERY HIGH   0 D-90 D    19 - 174         ----         ----        ----  91 D- 9 Y     0 -  74        75 -  99     >/= 100      ----    10-19 Y     0 -  89        90 - 129     >/= 130      ----    20-24 Y     0 - 114       115 - 149     >/= 150      ----         >24 Y     0 - 149       150 - 199    200- 499    >/= 500  .   Venipuncture immediately after or during the    administration of Metamizole may lead to falsely   low results. Testing should be performed immediately   prior to Metamizole dosing.    TSH 07/27/2023 2.41  0.44 - 3.98 mIU/L Final     TSH testing is performed using different testing    methodology at [facility]  than at other    system hospitals. Direct result comparisons should    only be made within the same method.    Vitamin D, 25-Hydroxy 07/27/2023 87  ng/mL Final    .  DEFICIENCY:         < 20   NG/ML  INSUFFICIENCY:      20-29  NG/ML  SUFFICIENCY:         NG/ML    THIS ASSAY ACCURATELY QUANTIFIES THE SUM OF  VITAMIN D3, 25-HYDROXY AND VIT D2,25-HYDROXY.    Vitamin B-12 07/27/2023 226  211 - 911 pg/mL Final    Hemoglobin A1C 07/27/2023 5.9 (A)  % Final    Comment:      Diagnosis of Diabetes-Adults   Non-Diabetic: < or = 5.6%   Increased risk for developing diabetes: 5.7-6.4%   Diagnostic of diabetes: > or = 6.5%  .       Monitoring of Diabetes                Age (y)     Therapeutic Goal (%)   Adults:          >18           <7.0   Pediatrics:    13-18           <7.5                   7-12           <8.0                   0- 6            7.5-8.5   American Diabetes Association. Diabetes Care 33(S1), Jan 2010.      Estimated Average Glucose 07/27/2023 123  MG/DL Final     Current Outpatient Medications on File Prior to Visit   Medication Sig Dispense Refill    amLODIPine (Norvasc) 5 mg tablet TAKE ONE TABLET BY MOUTH ONCE DAILY. 90 tablet 0    cholecalciferol (Vitamin D-3) 125 MCG (5000 UT) capsule Take 1 capsule (125 mcg) by mouth once daily.      DULoxetine (Cymbalta) 60 mg DR capsule TAKE ONE CAPSULE BY MOUTH ONCE DAILY. 90 capsule 0    magnesium oxide 400 mg magnesium capsule Take 1 capsule (400 mg) by mouth once daily.      omega 3-dha-epa-fish oil 1,200 (144-216) mg capsule Take 1 capsule (1,200 mg) by mouth once daily.       No current facility-administered medications on file prior to visit.     No images are attached to the encounter.            Assessment/Plan   Problem List Items Addressed This Visit             ICD-10-CM    TIA (transient ischemic attack) G45.9    Hospital discharge follow-up - Primary Z09     Blood work, records, and images reviewed  CT head and MRI imaging was negative for CVA            Gait instability R26.81    Relevant Orders    Referral to Physical Therapy

## 2024-03-27 ENCOUNTER — PATIENT OUTREACH (OUTPATIENT)
Dept: CARE COORDINATION | Facility: CLINIC | Age: 84
End: 2024-03-27
Payer: MEDICARE

## 2024-03-27 NOTE — PROGRESS NOTES
Unable to reach patient for call back after patient's follow up appointment with PCP.  LVM  with call back number for patient to call if needed.

## 2024-04-01 DIAGNOSIS — G62.9 NEUROPATHY: ICD-10-CM

## 2024-04-02 RX ORDER — DULOXETIN HYDROCHLORIDE 60 MG/1
CAPSULE, DELAYED RELEASE ORAL
Qty: 90 CAPSULE | Refills: 0 | Status: SHIPPED | OUTPATIENT
Start: 2024-04-02

## 2024-04-26 ENCOUNTER — PATIENT OUTREACH (OUTPATIENT)
Dept: CARE COORDINATION | Facility: CLINIC | Age: 84
End: 2024-04-26
Payer: MEDICARE

## 2024-04-26 NOTE — PROGRESS NOTES
Unable to reach patient for one month post discharge follow up call.               LVM with call back number for patient to call if needed    If no voicemail available call attempts x 2 were made to contact the patient to   assist with  any questions or concerns patient may have.

## 2024-05-25 DIAGNOSIS — I10 PRIMARY HYPERTENSION: ICD-10-CM

## 2024-05-25 RX ORDER — AMLODIPINE BESYLATE 5 MG/1
TABLET ORAL
Qty: 90 TABLET | Refills: 0 | Status: SHIPPED | OUTPATIENT
Start: 2024-05-25

## 2024-06-05 ENCOUNTER — PATIENT OUTREACH (OUTPATIENT)
Dept: CARE COORDINATION | Facility: CLINIC | Age: 84
End: 2024-06-05
Payer: MEDICARE

## 2024-06-05 NOTE — PROGRESS NOTES
Unable to reach patient for the 90 days post discharge follow up call.               LVM with call back number for patient to call if needed

## 2024-08-04 DIAGNOSIS — G62.9 NEUROPATHY: ICD-10-CM

## 2024-08-05 RX ORDER — DULOXETIN HYDROCHLORIDE 60 MG/1
CAPSULE, DELAYED RELEASE ORAL
Qty: 90 CAPSULE | Refills: 0 | Status: SHIPPED | OUTPATIENT
Start: 2024-08-05

## 2024-09-16 ENCOUNTER — TELEPHONE (OUTPATIENT)
Dept: PRIMARY CARE | Facility: CLINIC | Age: 84
End: 2024-09-16
Payer: MEDICARE

## 2024-09-16 NOTE — TELEPHONE ENCOUNTER
Pt called in stating she was talking to someone and they told her to test for covid. Pt states she tested on Friday and it was positive. She doesn't know what she should do. I offered a virtual but she doesn't know how to do that. I spoke to linda and she said to send you a message. Please advise    Pt number [phone]

## 2024-09-23 NOTE — TELEPHONE ENCOUNTER
Padmaja pt left a message saying that she started w/ her symptoms on 9/12/24 & she tested positive for covid on 9/13. Dr Giang offered to do a virtual visit, but pt didn't think it was necessary.   She said that she is still getting the sweats off & on & she is still weak & she is asking how long will it take her to get over the virus? How long will she feel this way?

## 2024-10-14 ENCOUNTER — LAB (OUTPATIENT)
Dept: LAB | Facility: LAB | Age: 84
End: 2024-10-14
Payer: MEDICARE

## 2024-10-14 DIAGNOSIS — E55.9 VITAMIN D DEFICIENCY: ICD-10-CM

## 2024-10-14 DIAGNOSIS — I10 PRIMARY HYPERTENSION: ICD-10-CM

## 2024-10-14 DIAGNOSIS — R73.02 GLUCOSE INTOLERANCE (IMPAIRED GLUCOSE TOLERANCE): ICD-10-CM

## 2024-10-14 LAB
25(OH)D3 SERPL-MCNC: 69 NG/ML (ref 30–100)
ALBUMIN SERPL BCP-MCNC: 4.5 G/DL (ref 3.4–5)
ALP SERPL-CCNC: 54 U/L (ref 33–136)
ALT SERPL W P-5'-P-CCNC: 5 U/L (ref 7–45)
ANION GAP SERPL CALC-SCNC: 12 MMOL/L (ref 10–20)
AST SERPL W P-5'-P-CCNC: 13 U/L (ref 9–39)
BASOPHILS # BLD AUTO: 0.02 X10*3/UL (ref 0–0.1)
BASOPHILS NFR BLD AUTO: 0.4 %
BILIRUB SERPL-MCNC: 0.5 MG/DL (ref 0–1.2)
BUN SERPL-MCNC: 17 MG/DL (ref 6–23)
CALCIUM SERPL-MCNC: 10 MG/DL (ref 8.6–10.6)
CHLORIDE SERPL-SCNC: 102 MMOL/L (ref 98–107)
CHOLEST SERPL-MCNC: 170 MG/DL (ref 0–199)
CHOLESTEROL/HDL RATIO: 2.7
CO2 SERPL-SCNC: 29 MMOL/L (ref 21–32)
CREAT SERPL-MCNC: 0.82 MG/DL (ref 0.5–1.05)
EGFRCR SERPLBLD CKD-EPI 2021: 71 ML/MIN/1.73M*2
EOSINOPHIL # BLD AUTO: 0.09 X10*3/UL (ref 0–0.4)
EOSINOPHIL NFR BLD AUTO: 1.8 %
ERYTHROCYTE [DISTWIDTH] IN BLOOD BY AUTOMATED COUNT: 12.6 % (ref 11.5–14.5)
EST. AVERAGE GLUCOSE BLD GHB EST-MCNC: 108 MG/DL
GLUCOSE SERPL-MCNC: 105 MG/DL (ref 74–99)
HBA1C MFR BLD: 5.4 %
HCT VFR BLD AUTO: 38.4 % (ref 36–46)
HDLC SERPL-MCNC: 62.4 MG/DL
HGB BLD-MCNC: 13 G/DL (ref 12–16)
IMM GRANULOCYTES # BLD AUTO: 0.01 X10*3/UL (ref 0–0.5)
IMM GRANULOCYTES NFR BLD AUTO: 0.2 % (ref 0–0.9)
LDLC SERPL CALC-MCNC: 91 MG/DL
LYMPHOCYTES # BLD AUTO: 1.67 X10*3/UL (ref 0.8–3)
LYMPHOCYTES NFR BLD AUTO: 33.3 %
MCH RBC QN AUTO: 31.9 PG (ref 26–34)
MCHC RBC AUTO-ENTMCNC: 33.9 G/DL (ref 32–36)
MCV RBC AUTO: 94 FL (ref 80–100)
MONOCYTES # BLD AUTO: 0.43 X10*3/UL (ref 0.05–0.8)
MONOCYTES NFR BLD AUTO: 8.6 %
NEUTROPHILS # BLD AUTO: 2.79 X10*3/UL (ref 1.6–5.5)
NEUTROPHILS NFR BLD AUTO: 55.7 %
NON HDL CHOLESTEROL: 108 MG/DL (ref 0–149)
NRBC BLD-RTO: 0 /100 WBCS (ref 0–0)
PLATELET # BLD AUTO: 299 X10*3/UL (ref 150–450)
POTASSIUM SERPL-SCNC: 3.9 MMOL/L (ref 3.5–5.3)
PROT SERPL-MCNC: 7 G/DL (ref 6.4–8.2)
RBC # BLD AUTO: 4.08 X10*6/UL (ref 4–5.2)
SODIUM SERPL-SCNC: 139 MMOL/L (ref 136–145)
TRIGL SERPL-MCNC: 82 MG/DL (ref 0–149)
TSH SERPL-ACNC: 3.16 MIU/L (ref 0.44–3.98)
VLDL: 16 MG/DL (ref 0–40)
WBC # BLD AUTO: 5 X10*3/UL (ref 4.4–11.3)

## 2024-10-14 PROCEDURE — 36415 COLL VENOUS BLD VENIPUNCTURE: CPT

## 2024-10-14 PROCEDURE — 80061 LIPID PANEL: CPT

## 2024-10-14 PROCEDURE — 83036 HEMOGLOBIN GLYCOSYLATED A1C: CPT

## 2024-10-14 PROCEDURE — 82306 VITAMIN D 25 HYDROXY: CPT

## 2024-10-14 PROCEDURE — 84443 ASSAY THYROID STIM HORMONE: CPT

## 2024-10-14 PROCEDURE — 80053 COMPREHEN METABOLIC PANEL: CPT

## 2024-10-14 PROCEDURE — 85025 COMPLETE CBC W/AUTO DIFF WBC: CPT

## 2024-10-16 ENCOUNTER — APPOINTMENT (OUTPATIENT)
Dept: PRIMARY CARE | Facility: CLINIC | Age: 84
End: 2024-10-16
Payer: MEDICARE

## 2024-10-16 DIAGNOSIS — I10 PRIMARY HYPERTENSION: ICD-10-CM

## 2024-10-16 PROBLEM — G45.9 TIA (TRANSIENT ISCHEMIC ATTACK): Status: ACTIVE | Noted: 2024-03-08

## 2024-10-16 PROBLEM — B07.0 PLANTAR WART OF RIGHT FOOT: Status: RESOLVED | Noted: 2023-03-09 | Resolved: 2024-10-16

## 2024-10-16 PROBLEM — R20.2 PARESTHESIA: Status: RESOLVED | Noted: 2023-03-09 | Resolved: 2024-10-16

## 2024-10-16 PROBLEM — Z09 HOSPITAL DISCHARGE FOLLOW-UP: Status: RESOLVED | Noted: 2024-03-26 | Resolved: 2024-10-16

## 2024-10-16 PROBLEM — R51.9 HEADACHE: Status: RESOLVED | Noted: 2023-03-09 | Resolved: 2024-10-16

## 2024-10-16 PROBLEM — M79.603 PAIN IN ARM: Status: RESOLVED | Noted: 2023-03-09 | Resolved: 2024-10-16
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
## 2024-10-16 PROBLEM — M65.90 SYNOVITIS: Status: RESOLVED | Noted: 2023-03-09 | Resolved: 2024-10-16

## 2024-10-17 RX ORDER — AMLODIPINE BESYLATE 5 MG/1
TABLET ORAL
Qty: 90 TABLET | Refills: 0 | Status: SHIPPED | OUTPATIENT
Start: 2024-10-17

## 2024-10-18 ENCOUNTER — OFFICE VISIT (OUTPATIENT)
Dept: PRIMARY CARE | Facility: CLINIC | Age: 84
End: 2024-10-18
Payer: MEDICARE

## 2024-10-18 VITALS
BODY MASS INDEX: 21.48 KG/M2 | WEIGHT: 109.4 LBS | HEIGHT: 60 IN | DIASTOLIC BLOOD PRESSURE: 60 MMHG | SYSTOLIC BLOOD PRESSURE: 122 MMHG | HEART RATE: 87 BPM

## 2024-10-18 DIAGNOSIS — R73.02 IMPAIRED GLUCOSE TOLERANCE: ICD-10-CM

## 2024-10-18 DIAGNOSIS — Z78.0 ASYMPTOMATIC MENOPAUSAL STATE: ICD-10-CM

## 2024-10-18 DIAGNOSIS — E55.9 VITAMIN D DEFICIENCY: ICD-10-CM

## 2024-10-18 DIAGNOSIS — Z00.00 HEALTHCARE MAINTENANCE: Primary | ICD-10-CM

## 2024-10-18 DIAGNOSIS — G45.9 TIA (TRANSIENT ISCHEMIC ATTACK): ICD-10-CM

## 2024-10-18 DIAGNOSIS — Z00.00 ROUTINE GENERAL MEDICAL EXAMINATION AT HEALTH CARE FACILITY: ICD-10-CM

## 2024-10-18 DIAGNOSIS — F41.9 ANXIETY: ICD-10-CM

## 2024-10-18 DIAGNOSIS — Z12.31 ENCOUNTER FOR SCREENING MAMMOGRAM FOR BREAST CANCER: ICD-10-CM

## 2024-10-18 DIAGNOSIS — M11.20 CALCIUM PYROPHOSPHATE DEPOSITION DISEASE (CPPD): ICD-10-CM

## 2024-10-18 DIAGNOSIS — I10 PRIMARY HYPERTENSION: ICD-10-CM

## 2024-10-18 DIAGNOSIS — R26.81 GAIT INSTABILITY: ICD-10-CM

## 2024-10-18 DIAGNOSIS — Z71.89 ADVANCE DIRECTIVE DISCUSSED WITH PATIENT: ICD-10-CM

## 2024-10-18 PROCEDURE — 1160F RVW MEDS BY RX/DR IN RCRD: CPT | Performed by: FAMILY MEDICINE

## 2024-10-18 PROCEDURE — 3078F DIAST BP <80 MM HG: CPT | Performed by: FAMILY MEDICINE

## 2024-10-18 PROCEDURE — 3074F SYST BP LT 130 MM HG: CPT | Performed by: FAMILY MEDICINE

## 2024-10-18 PROCEDURE — G0439 PPPS, SUBSEQ VISIT: HCPCS | Performed by: FAMILY MEDICINE

## 2024-10-18 PROCEDURE — 1170F FXNL STATUS ASSESSED: CPT | Performed by: FAMILY MEDICINE

## 2024-10-18 PROCEDURE — 99397 PER PM REEVAL EST PAT 65+ YR: CPT | Performed by: FAMILY MEDICINE

## 2024-10-18 PROCEDURE — 99497 ADVNCD CARE PLAN 30 MIN: CPT | Performed by: FAMILY MEDICINE

## 2024-10-18 PROCEDURE — 1036F TOBACCO NON-USER: CPT | Performed by: FAMILY MEDICINE

## 2024-10-18 PROCEDURE — 1123F ACP DISCUSS/DSCN MKR DOCD: CPT | Performed by: FAMILY MEDICINE

## 2024-10-18 PROCEDURE — 99214 OFFICE O/P EST MOD 30 MIN: CPT | Performed by: FAMILY MEDICINE

## 2024-10-18 PROCEDURE — 1159F MED LIST DOCD IN RCRD: CPT | Performed by: FAMILY MEDICINE

## 2024-10-18 PROCEDURE — 1158F ADVNC CARE PLAN TLK DOCD: CPT | Performed by: FAMILY MEDICINE

## 2024-10-18 ASSESSMENT — ACTIVITIES OF DAILY LIVING (ADL)
DOING_HOUSEWORK: INDEPENDENT
MANAGING_FINANCES: INDEPENDENT
BATHING: INDEPENDENT
DRESSING: INDEPENDENT
TAKING_MEDICATION: INDEPENDENT
GROCERY_SHOPPING: INDEPENDENT

## 2024-10-18 ASSESSMENT — ENCOUNTER SYMPTOMS
BACK PAIN: 0
DIZZINESS: 0
ACTIVITY CHANGE: 0
HEADACHES: 0
LIGHT-HEADEDNESS: 0
FEVER: 0
PALPITATIONS: 0
ARTHRALGIAS: 0
FATIGUE: 0
APPETITE CHANGE: 0
CHOKING: 0
OCCASIONAL FEELINGS OF UNSTEADINESS: 0
CHEST TIGHTNESS: 0
COLOR CHANGE: 0
DEPRESSION: 0
LOSS OF SENSATION IN FEET: 1
FACIAL ASYMMETRY: 0
COUGH: 0

## 2024-10-18 ASSESSMENT — PATIENT HEALTH QUESTIONNAIRE - PHQ9
1. LITTLE INTEREST OR PLEASURE IN DOING THINGS: NOT AT ALL
SUM OF ALL RESPONSES TO PHQ9 QUESTIONS 1 AND 2: 0
2. FEELING DOWN, DEPRESSED OR HOPELESS: NOT AT ALL

## 2024-10-18 NOTE — PROGRESS NOTES
Subjective   Reason for Visit: Kandice Lobo is an 84 y.o. female here for a Medicare Wellness visit.     Past Medical, Surgical, and Family History reviewed and updated in chart.    Reviewed all medications by prescribing practitioner or clinical pharmacist (such as prescriptions, OTCs, herbal therapies and supplements) and documented in the medical record.    HPI  Patient presents for physical exam.  Reports she had a rough night last night that she was having difficulty with some back pain currently she is still having some squeezing type feeling on her left side of her chest.  She reports that she did have 2 chewed 81 mg aspirin last night and this improved her symptoms slightly.     Fam Hx  Mom (82) , kidney to bone cancer (70)  Dad (91) , tobacco, sepsis,      Exercise walks  ETOH denies  Caffeine 1-2 cups tea/day, one soda every couple of days  Tobacco denies  4 sons     Mammogram due   DEXA  , osteopenia, [facility] due   Colonoscopy  Dr. Larsen does not need again per patient because of age     Patient denies other complaints at this time.   Patient Self Assessment of Health Status  Patient Self Assessment: Good    Nutrition and Exercise  Current Diet: Well Balanced Diet  Adequate Fluid Intake: Yes  Caffeine: Yes  Exercise Frequency: Infrequently    Functional Ability/Level of Safety  Cognitive Impairment Observed: No cognitive impairment observed  Cognitive Impairment Reported: No cognitive impairment reported by patient or family    Home Safety Risk Factors: No grab bars, bathroom    Patient Care Team:  Vanita Giang DO as PCP - General  Vanita Giang DO as PCP - [employer] PCP  Mynor Branch MD as Referring Physician (Orthopaedic Surgery)  Franc Stiles MD as Consulting Physician (Cardiology)     Review of Systems   Constitutional:  Negative for activity change, appetite change, fatigue and fever.   HENT:  Negative for  "congestion.    Respiratory:  Negative for cough, choking and chest tightness.    Cardiovascular:  Negative for chest pain, palpitations and leg swelling.   Musculoskeletal:  Negative for arthralgias, back pain and gait problem.   Skin:  Negative for color change and pallor.   Neurological:  Negative for dizziness, facial asymmetry, light-headedness and headaches.       Objective   Vitals:  /60 (BP Location: Right arm, Patient Position: Sitting)   Pulse 87   Ht 1.518 m (4' 11.75\")   Wt 49.6 kg (109 lb 6.4 oz)   BMI 21.54 kg/m²       Physical Exam  Constitutional:       General: She is not in acute distress.     Appearance: Normal appearance. She is not toxic-appearing.   HENT:      Head: Normocephalic.      Right Ear: Tympanic membrane, ear canal and external ear normal.      Left Ear: Tympanic membrane, ear canal and external ear normal.      Nose: Nose normal.      Mouth/Throat:      Pharynx: Oropharynx is clear.   Eyes:      Conjunctiva/sclera: Conjunctivae normal.      Pupils: Pupils are equal, round, and reactive to light.   Cardiovascular:      Rate and Rhythm: Normal rate and regular rhythm.      Pulses: Normal pulses.      Heart sounds: Normal heart sounds.   Pulmonary:      Effort: No respiratory distress.      Breath sounds: No wheezing, rhonchi or rales.   Abdominal:      General: Bowel sounds are normal. There is no distension.      Palpations: Abdomen is soft.      Tenderness: There is no abdominal tenderness.   Musculoskeletal:         General: No swelling or tenderness.      Cervical back: No tenderness.   Skin:     Findings: No lesion or rash.   Neurological:      General: No focal deficit present.      Mental Status: She is alert and oriented to person, place, and time. Mental status is at baseline.      Gait: Gait normal.   Psychiatric:         Mood and Affect: Mood normal.         Behavior: Behavior normal.         Thought Content: Thought content normal.         Judgment: Judgment " normal.         Assessment/Plan   Problem List Items Addressed This Visit       Anxiety    Impaired glucose tolerance    Hypertension    Calcium pyrophosphate deposition disease (CPPD)    Vitamin D deficiency    TIA (transient ischemic attack)    Gait instability     Other Visit Diagnoses       Healthcare maintenance    -  Primary          1. Patient's blood work discussed at this office visit  2. Patient's LDL goal less than 70, current LDL 91  3. Patient's glucose elevated continue a no added sugar diet, patient's hemoglobin A1c 5.4, This is at increased risk of developing diabetes  4. Patient's thyroid tests back to normal, continue to monitor  5. Mammogram due 2024  6. DEXA 2019 , osteopenia, [facility] due 2024  7. Colonoscopy 2016 Dr. Larsen does not need again per patient because of age  8. Patient to call if questions or concerns.

## 2024-10-18 NOTE — ACP (ADVANCE CARE PLANNING)
Confirming Previous Code Status:   Advance Care Planning Note     Discussion Date: 10/18/24   Discussion Participants: patient    The patient wishes to discuss Advance Care Planning today and the following is a brief summary of our discussion.     Patient has capacity to make their own medical decisions: Yes  Health Care Agent/Surrogate Decision Maker documented in chart: Yes    Documents on file and valid:  Advance Directive/Living Will: Yes   Health Care Power of : Yes  Other:     Communication of Medical Status/Prognosis:   stable    Communication of Treatment Goals/Options:   stable    Treatment Decisions  Goals of Care: quality of life is prioritized; willing to accept low-burden treatments to achieve meaningful goals   Does not want to be on a ventilator  Follow Up Plan  stable  Team Members  stable  Time Statement: Total face to face time spent on advance care planning was 5 minutes with 16 minutes spent in counseling, including the explanation.    Vanita Giang DO  10/18/2024 1:44 PM

## 2024-10-23 ENCOUNTER — HOSPITAL ENCOUNTER (OUTPATIENT)
Dept: RADIOLOGY | Facility: CLINIC | Age: 84
Discharge: HOME | End: 2024-10-23
Payer: MEDICARE

## 2024-10-23 VITALS — HEIGHT: 60 IN | BODY MASS INDEX: 21.4 KG/M2 | WEIGHT: 109 LBS

## 2024-10-23 DIAGNOSIS — Z12.31 ENCOUNTER FOR SCREENING MAMMOGRAM FOR MALIGNANT NEOPLASM OF BREAST: ICD-10-CM

## 2024-10-23 PROCEDURE — 77067 SCR MAMMO BI INCL CAD: CPT

## 2024-10-28 DIAGNOSIS — R92.8 ABNORMAL MAMMOGRAM OF RIGHT BREAST: ICD-10-CM

## 2024-11-22 ENCOUNTER — HOSPITAL ENCOUNTER (OUTPATIENT)
Dept: RADIOLOGY | Facility: CLINIC | Age: 84
Discharge: HOME | End: 2024-11-22
Payer: MEDICARE

## 2024-11-22 DIAGNOSIS — R92.8 ABNORMAL MAMMOGRAM OF RIGHT BREAST: ICD-10-CM

## 2024-11-22 PROCEDURE — 77061 BREAST TOMOSYNTHESIS UNI: CPT | Mod: RT

## 2024-12-12 ENCOUNTER — APPOINTMENT (OUTPATIENT)
Dept: CARDIOLOGY | Facility: CLINIC | Age: 84
End: 2024-12-12
Payer: MEDICARE

## 2024-12-13 ENCOUNTER — APPOINTMENT (OUTPATIENT)
Dept: RADIOLOGY | Facility: CLINIC | Age: 84
End: 2024-12-13
Payer: MEDICARE

## 2024-12-17 DIAGNOSIS — G62.9 NEUROPATHY: ICD-10-CM

## 2024-12-17 RX ORDER — DULOXETIN HYDROCHLORIDE 60 MG/1
60 CAPSULE, DELAYED RELEASE ORAL DAILY
Qty: 90 CAPSULE | Refills: 0 | Status: SHIPPED | OUTPATIENT
Start: 2024-12-17

## 2025-01-22 ENCOUNTER — TELEPHONE (OUTPATIENT)
Dept: PRIMARY CARE | Facility: CLINIC | Age: 85
End: 2025-01-22
Payer: MEDICARE

## 2025-01-22 NOTE — TELEPHONE ENCOUNTER
Pt stated she was sent a message anthem that her medication   DULoxetine (Cymbalta) 60 mg DR capsule   Was recalled and inquiring if she should switch to a different medication please advise thank you    Pt phone: [phone]

## 2025-02-03 ENCOUNTER — APPOINTMENT (OUTPATIENT)
Dept: PRIMARY CARE | Facility: CLINIC | Age: 85
End: 2025-02-03
Payer: MEDICARE

## 2025-02-03 ENCOUNTER — HOSPITAL ENCOUNTER (OUTPATIENT)
Dept: RADIOLOGY | Facility: CLINIC | Age: 85
Discharge: HOME | End: 2025-02-03
Payer: MEDICARE

## 2025-02-03 VITALS
DIASTOLIC BLOOD PRESSURE: 62 MMHG | TEMPERATURE: 97.5 F | HEART RATE: 72 BPM | OXYGEN SATURATION: 100 % | WEIGHT: 110 LBS | BODY MASS INDEX: 21.65 KG/M2 | SYSTOLIC BLOOD PRESSURE: 120 MMHG

## 2025-02-03 DIAGNOSIS — J06.9 ACUTE URI: ICD-10-CM

## 2025-02-03 DIAGNOSIS — J06.9 ACUTE URI: Primary | ICD-10-CM

## 2025-02-03 PROCEDURE — 3074F SYST BP LT 130 MM HG: CPT | Performed by: FAMILY MEDICINE

## 2025-02-03 PROCEDURE — 1123F ACP DISCUSS/DSCN MKR DOCD: CPT | Performed by: FAMILY MEDICINE

## 2025-02-03 PROCEDURE — 1159F MED LIST DOCD IN RCRD: CPT | Performed by: FAMILY MEDICINE

## 2025-02-03 PROCEDURE — 71046 X-RAY EXAM CHEST 2 VIEWS: CPT | Performed by: RADIOLOGY

## 2025-02-03 PROCEDURE — 1160F RVW MEDS BY RX/DR IN RCRD: CPT | Performed by: FAMILY MEDICINE

## 2025-02-03 PROCEDURE — 1036F TOBACCO NON-USER: CPT | Performed by: FAMILY MEDICINE

## 2025-02-03 PROCEDURE — 71046 X-RAY EXAM CHEST 2 VIEWS: CPT

## 2025-02-03 PROCEDURE — 3078F DIAST BP <80 MM HG: CPT | Performed by: FAMILY MEDICINE

## 2025-02-03 PROCEDURE — 99214 OFFICE O/P EST MOD 30 MIN: CPT | Performed by: FAMILY MEDICINE

## 2025-02-03 RX ORDER — DOXYCYCLINE 100 MG/1
100 CAPSULE ORAL 2 TIMES DAILY
Qty: 20 CAPSULE | Refills: 0 | Status: SHIPPED | OUTPATIENT
Start: 2025-02-03 | End: 2025-02-13

## 2025-02-03 ASSESSMENT — ENCOUNTER SYMPTOMS
CHEST TIGHTNESS: 0
HEADACHES: 0
FEVER: 0
FATIGUE: 0
ARTHRALGIAS: 0
DEPRESSION: 0
VOMITING: 1
COUGH: 1
PALPITATIONS: 0
DIARRHEA: 1
LIGHT-HEADEDNESS: 0
BACK PAIN: 0
CHOKING: 0
FACIAL ASYMMETRY: 0
COLOR CHANGE: 0
LOSS OF SENSATION IN FEET: 1
OCCASIONAL FEELINGS OF UNSTEADINESS: 0
NAUSEA: 1
APPETITE CHANGE: 0
DIZZINESS: 0
ACTIVITY CHANGE: 0

## 2025-02-03 NOTE — PROGRESS NOTES
Subjective   Patient ID: Kandice Lobo is a 84 y.o. female who presents for Cough (With chest congestion, nasal drainage, chills. X 15 days ).    HPI   Reports she has a cough chest congestion nasal drainage with chills for 15 days. 3 days ago of vomiting and some diarrhea as well.     Review of Systems   Constitutional:  Negative for activity change, appetite change, fatigue and fever.   HENT:  Positive for congestion.    Respiratory:  Positive for cough. Negative for choking and chest tightness.    Cardiovascular:  Negative for chest pain, palpitations and leg swelling.   Gastrointestinal:  Positive for diarrhea, nausea and vomiting.   Musculoskeletal:  Negative for arthralgias, back pain and gait problem.   Skin:  Negative for color change and pallor.   Neurological:  Negative for dizziness, facial asymmetry, light-headedness and headaches.       Objective   /62 (BP Location: Left arm, Patient Position: Sitting)   Pulse 72   Temp 36.4 °C (97.5 °F)   Wt 49.9 kg (110 lb)   LMP  (LMP Unknown)   SpO2 100%   BMI 21.65 kg/m²   BSA Body surface area is 1.45 meters squared.      Physical Exam  Constitutional:       General: She is not in acute distress.     Appearance: Normal appearance. She is not toxic-appearing.   HENT:      Head: Normocephalic.      Right Ear: Tympanic membrane, ear canal and external ear normal.      Left Ear: Tympanic membrane, ear canal and external ear normal.   Eyes:      Conjunctiva/sclera: Conjunctivae normal.      Pupils: Pupils are equal, round, and reactive to light.   Cardiovascular:      Rate and Rhythm: Normal rate and regular rhythm.      Pulses: Normal pulses.      Heart sounds: Normal heart sounds.   Pulmonary:      Effort: No respiratory distress.      Breath sounds: No wheezing, rhonchi or rales.   Musculoskeletal:         General: No swelling or tenderness.      Cervical back: No tenderness.   Skin:     Findings: No lesion or rash.   Neurological:      General: No  focal deficit present.      Mental Status: She is alert and oriented to person, place, and time. Mental status is at baseline.      Gait: Gait normal.   Psychiatric:         Mood and Affect: Mood normal.         Behavior: Behavior normal.         Thought Content: Thought content normal.         Judgment: Judgment normal.       Lab on 10/14/2024   Component Date Value Ref Range Status    WBC 10/14/2024 5.0  4.4 - 11.3 x10*3/uL Final    nRBC 10/14/2024 0.0  0.0 - 0.0 /100 WBCs Final    RBC 10/14/2024 4.08  4.00 - 5.20 x10*6/uL Final    Hemoglobin 10/14/2024 13.0  12.0 - 16.0 g/dL Final    Hematocrit 10/14/2024 38.4  36.0 - 46.0 % Final    MCV 10/14/2024 94  80 - 100 fL Final    MCH 10/14/2024 31.9  26.0 - 34.0 pg Final    MCHC 10/14/2024 33.9  32.0 - 36.0 g/dL Final    RDW 10/14/2024 12.6  11.5 - 14.5 % Final    Platelets 10/14/2024 299  150 - 450 x10*3/uL Final    Neutrophils % 10/14/2024 55.7  40.0 - 80.0 % Final    Immature Granulocytes %, Automated 10/14/2024 0.2  0.0 - 0.9 % Final    Immature Granulocyte Count (IG) includes promyelocytes, myelocytes and metamyelocytes but does not include bands. Percent differential counts (%) should be interpreted in the context of the absolute cell counts (cells/UL).    Lymphocytes % 10/14/2024 33.3  13.0 - 44.0 % Final    Monocytes % 10/14/2024 8.6  2.0 - 10.0 % Final    Eosinophils % 10/14/2024 1.8  0.0 - 6.0 % Final    Basophils % 10/14/2024 0.4  0.0 - 2.0 % Final    Neutrophils Absolute 10/14/2024 2.79  1.60 - 5.50 x10*3/uL Final    Percent differential counts (%) should be interpreted in the context of the absolute cell counts (cells/uL).    Immature Granulocytes Absolute, Au* 10/14/2024 0.01  0.00 - 0.50 x10*3/uL Final    Lymphocytes Absolute 10/14/2024 1.67  0.80 - 3.00 x10*3/uL Final    Monocytes Absolute 10/14/2024 0.43  0.05 - 0.80 x10*3/uL Final    Eosinophils Absolute 10/14/2024 0.09  0.00 - 0.40 x10*3/uL Final    Basophils Absolute 10/14/2024 0.02  0.00 - 0.10  x10*3/uL Final    Glucose 10/14/2024 105 (H)  74 - 99 mg/dL Final    Sodium 10/14/2024 139  136 - 145 mmol/L Final    Potassium 10/14/2024 3.9  3.5 - 5.3 mmol/L Final    Chloride 10/14/2024 102  98 - 107 mmol/L Final    Bicarbonate 10/14/2024 29  21 - 32 mmol/L Final    Anion Gap 10/14/2024 12  10 - 20 mmol/L Final    Urea Nitrogen 10/14/2024 17  6 - 23 mg/dL Final    Creatinine 10/14/2024 0.82  0.50 - 1.05 mg/dL Final    eGFR 10/14/2024 71  >60 mL/min/1.73m*2 Final    Calculations of estimated GFR are performed using the 2021 CKD-EPI Study Refit equation without the race variable for the IDMS-Traceable creatinine methods.  https://jasn.asnjournals.org/content/early/2021/09/22/ASN.0050546469    Calcium 10/14/2024 10.0  8.6 - 10.6 mg/dL Final    Albumin 10/14/2024 4.5  3.4 - 5.0 g/dL Final    Alkaline Phosphatase 10/14/2024 54  33 - 136 U/L Final    Total Protein 10/14/2024 7.0  6.4 - 8.2 g/dL Final    AST 10/14/2024 13  9 - 39 U/L Final    Bilirubin, Total 10/14/2024 0.5  0.0 - 1.2 mg/dL Final    ALT 10/14/2024 5 (L)  7 - 45 U/L Final    Patients treated with Sulfasalazine may generate falsely decreased results for ALT.    Cholesterol 10/14/2024 170  0 - 199 mg/dL Final          Age      Desirable   Borderline High   High     0-19 Y     0 - 169       170 - 199     >/= 200    20-24 Y     0 - 189       190 - 224     >/= 225         >24 Y     0 - 199       200 - 239     >/= 240   **All ranges are based on fasting samples. Specific   therapeutic targets will vary based on patient-specific   cardiac risk.    Pediatric guidelines reference:Pediatrics 2011, 128(S5).Adult guidelines reference: NCEP ATPIII Guidelines,SALEEM 2001, 258:2486-97    Venipuncture immediately after or during the administration of Metamizole may lead to falsely low results. Testing should be performed immediately prior to Metamizole dosing.    HDL-Cholesterol 10/14/2024 62.4  mg/dL Final      Age       Very Low   Low     Normal    High    0-19 Y    <  35      < 40     40-45     ----  20-24 Y    ----     < 40      >45      ----        >24 Y      ----     < 40     40-60      >60      Cholesterol/HDL Ratio 10/14/2024 2.7   Final      Ref Values  Desirable  < 3.4  High Risk  > 5.0    LDL Calculated 10/14/2024 91  <=99 mg/dL Final                                Near   Borderline      AGE      Desirable  Optimal    High     High     Very High     0-19 Y     0 - 109     ---    110-129   >/= 130     ----    20-24 Y     0 - 119     ---    120-159   >/= 160     ----      >24 Y     0 -  99   100-129  130-159   160-189     >/=190      VLDL 10/14/2024 16  0 - 40 mg/dL Final    Triglycerides 10/14/2024 82  0 - 149 mg/dL Final       Age         Desirable   Borderline High   High     Very High   0 D-90 D    19 - 174         ----         ----        ----  91 D- 9 Y     0 -  74        75 -  99     >/= 100      ----    10-19 Y     0 -  89        90 - 129     >/= 130      ----    20-24 Y     0 - 114       115 - 149     >/= 150      ----         >24 Y     0 - 149       150 - 199    200- 499    >/= 500    Venipuncture immediately after or during the administration of Metamizole may lead to falsely low results. Testing should be performed immediately prior to Metamizole dosing.    Non HDL Cholesterol 10/14/2024 108  0 - 149 mg/dL Final          Age       Desirable   Borderline High   High     Very High     0-19 Y     0 - 119       120 - 144     >/= 145    >/= 160    20-24 Y     0 - 149       150 - 189     >/= 190      ----         >24 Y    30 mg/dL above LDL Cholesterol goal      Thyroid Stimulating Hormone 10/14/2024 3.16  0.44 - 3.98 mIU/L Final    Vitamin D, 25-Hydroxy, Total 10/14/2024 69  30 - 100 ng/mL Final    Hemoglobin A1C 10/14/2024 5.4  See comment % Final    Estimated Average Glucose 10/14/2024 108  Not Established mg/dL Final     Current Outpatient Medications on File Prior to Visit   Medication Sig Dispense Refill    amLODIPine (Norvasc) 5 mg tablet TAKE ONE TABLET BY  MOUTH ONCE DAILY. 90 tablet 0    cholecalciferol (Vitamin D-3) 125 MCG (5000 UT) capsule Take 1 capsule (125 mcg) by mouth once daily.      DULoxetine (Cymbalta) 60 mg DR capsule Take 1 capsule by mouth once daily. 90 capsule 0    magnesium oxide 400 mg magnesium capsule Take 1 capsule (400 mg) by mouth once daily.      omega 3-dha-epa-fish oil 1,200 (144-216) mg capsule Take 1 capsule (1,200 mg) by mouth once daily.       No current facility-administered medications on file prior to visit.     No images are attached to the encounter.            Assessment/Plan   Diagnoses and all orders for this visit:  Acute URI  -     RSV, SARS, INFlUENZA; Other-indicate in comment ([facility]); [id number] - Miscellaneous Test  -     XR chest 2 views; Future  -     doxycycline (Vibramycin) 100 mg capsule; Take 1 capsule (100 mg) by mouth 2 times a day for 10 days. Take with at least 8 ounces (large glass) of water, do not lie down for 30 minutes after    1.  Patient to start on antibiotics  2.  Patient to have CXR  3.  Patient to call for questions or concerns

## 2025-02-04 ENCOUNTER — APPOINTMENT (OUTPATIENT)
Dept: CARDIOLOGY | Facility: CLINIC | Age: 85
End: 2025-02-04
Payer: MEDICARE

## 2025-02-04 ENCOUNTER — TELEPHONE (OUTPATIENT)
Dept: PRIMARY CARE | Facility: CLINIC | Age: 85
End: 2025-02-04
Payer: MEDICARE

## 2025-02-25 ENCOUNTER — OFFICE VISIT (OUTPATIENT)
Dept: CARDIOLOGY | Facility: CLINIC | Age: 85
End: 2025-02-25
Payer: MEDICARE

## 2025-02-25 VITALS
HEART RATE: 70 BPM | DIASTOLIC BLOOD PRESSURE: 63 MMHG | OXYGEN SATURATION: 98 % | WEIGHT: 111 LBS | SYSTOLIC BLOOD PRESSURE: 149 MMHG | BODY MASS INDEX: 21.85 KG/M2

## 2025-02-25 DIAGNOSIS — I10 PRIMARY HYPERTENSION: ICD-10-CM

## 2025-02-25 DIAGNOSIS — I49.3 PVC'S (PREMATURE VENTRICULAR CONTRACTIONS): ICD-10-CM

## 2025-02-25 DIAGNOSIS — R07.9 CHEST PAIN, UNSPECIFIED TYPE: ICD-10-CM

## 2025-02-25 PROCEDURE — 93010 ELECTROCARDIOGRAM REPORT: CPT | Performed by: STUDENT IN AN ORGANIZED HEALTH CARE EDUCATION/TRAINING PROGRAM

## 2025-02-25 PROCEDURE — 1036F TOBACCO NON-USER: CPT | Performed by: STUDENT IN AN ORGANIZED HEALTH CARE EDUCATION/TRAINING PROGRAM

## 2025-02-25 PROCEDURE — 1160F RVW MEDS BY RX/DR IN RCRD: CPT | Performed by: STUDENT IN AN ORGANIZED HEALTH CARE EDUCATION/TRAINING PROGRAM

## 2025-02-25 PROCEDURE — 99213 OFFICE O/P EST LOW 20 MIN: CPT | Mod: 25 | Performed by: STUDENT IN AN ORGANIZED HEALTH CARE EDUCATION/TRAINING PROGRAM

## 2025-02-25 PROCEDURE — 99213 OFFICE O/P EST LOW 20 MIN: CPT | Performed by: STUDENT IN AN ORGANIZED HEALTH CARE EDUCATION/TRAINING PROGRAM

## 2025-02-25 PROCEDURE — 1159F MED LIST DOCD IN RCRD: CPT | Performed by: STUDENT IN AN ORGANIZED HEALTH CARE EDUCATION/TRAINING PROGRAM

## 2025-02-25 PROCEDURE — 1123F ACP DISCUSS/DSCN MKR DOCD: CPT | Performed by: STUDENT IN AN ORGANIZED HEALTH CARE EDUCATION/TRAINING PROGRAM

## 2025-02-25 PROCEDURE — 93005 ELECTROCARDIOGRAM TRACING: CPT | Performed by: STUDENT IN AN ORGANIZED HEALTH CARE EDUCATION/TRAINING PROGRAM

## 2025-02-25 PROCEDURE — 3077F SYST BP >= 140 MM HG: CPT | Performed by: STUDENT IN AN ORGANIZED HEALTH CARE EDUCATION/TRAINING PROGRAM

## 2025-02-25 PROCEDURE — 3078F DIAST BP <80 MM HG: CPT | Performed by: STUDENT IN AN ORGANIZED HEALTH CARE EDUCATION/TRAINING PROGRAM

## 2025-02-25 ASSESSMENT — ENCOUNTER SYMPTOMS
PND: 0
PALPITATIONS: 0
EYES NEGATIVE: 1
CONSTITUTIONAL NEGATIVE: 1
MUSCULOSKELETAL NEGATIVE: 1
NEUROLOGICAL NEGATIVE: 1
SYNCOPE: 0
GASTROINTESTINAL NEGATIVE: 1
ENDOCRINE NEGATIVE: 1
ALLERGIC/IMMUNOLOGIC NEGATIVE: 1
PSYCHIATRIC NEGATIVE: 1
RESPIRATORY NEGATIVE: 1
ORTHOPNEA: 0

## 2025-02-25 NOTE — PROGRESS NOTES
Cardiology New Patient History and Physical    Reason for visit: follow-up regarding chest pain     HPI: Kandice Lobo is a 84 y.o.  female who presents for a follow-up for chest pain, hypertension. Past medical history of HTN, DLD, osteopenia, and arthritis.     Patient presented to cardiology clinic 10/17/2023.  Patient noted chest pain at night last week.  Patient was not exerting herself at the time.  Pain was centrally located, lasted for ~ 1 hour. Patient took 325 of aspirin, pain subsided spontaneously.  Patient was short of breath with episode. No nausea, vomiting or diaphoresis.  Patient has had some family stressors lately.  Patient had multiple risk factors for CAD (HTN, pre-DM, family history of CVD- father). Recommended further evaluation with TTE and exercise stress echo.     Kandice returned to cardiology clinic 12/12/2023. Transthoracic echocardiogram showed normal left ventricular ejection fraction, mild concentric LVH, mild diastolic dysfunction.  Nuclear medicine stress (11/8/2022) showed normal myocardial perfusion imaging and no ECG evidence for ischemia; left ventricular ejection fraction estimated at 74%.  Patient has no active cardiovascular complaints at this time.  No further episodes of chest pain patient. Kandice is tolerating physical activity without active cardiovascular complaints.    Past Medical History:   - As Above    Surgical History:   She has a past surgical history that includes Shoulder surgery (11/16/2015); Back surgery (11/16/2015); Other surgical history (11/16/2015); and Cataract extraction (11/15/2017).    Family History:   Family History   Problem Relation Name Age of Onset    Kidney cancer Mother          Passed at age 82    Bone cancer Mother      Dementia Father          Passed at age 91    Other (SEPTICEMIA) Father      Heart disease Sister          Passed at age 21    Stroke Brother      Other (covid19) Brother          Passed at age 90      Allergies:  Lisinopril     Social History:   - Never a smoker; no alcohol use; no illicit drug use; 4 sons (1 - drowned; 3 other sons leaving)    Prior Cardiovascular Testing (personally reviewed):     Pharmacologic NM Stress (2023):  1. Normal stress myocardial perfusion imaging in response to pharmacologic stress.  2. No ECG evidence of ischemia  3. Well-maintained left ventricular function.  74%    TTE (2023):   1. Left ventricular systolic function is normal with a 65-70% estimated ejection fraction.   2. Mild concentric LVH.   3. Spectral Doppler shows an impaired relaxation pattern of left ventricular diastolic filling.   4. RVSP within normal limits.    ECG (10/6/2023- reviewed; per my interpretation)- sinus rhythm; PVCs    Lipid Panel (2023)- total 169, HDL 60, LDL 94, Triglycerides 69 mg/dl    Carotid Duplex ()  - No sonographic evidence of plaque formation in the left cervical carotid arterial system.  - Very mild right carotid bulb intimal thickening. Otherwise, no identifiable right-sided plaque formation.    Review of Systems:  Review of Systems   Constitutional: Negative.   HENT: Negative.     Eyes: Negative.    Cardiovascular:  Negative for chest pain, orthopnea, palpitations, paroxysmal nocturnal dyspnea and syncope.   Respiratory: Negative.     Endocrine: Negative.    Skin: Negative.    Musculoskeletal: Negative.    Gastrointestinal: Negative.    Genitourinary: Negative.    Neurological: Negative.    Psychiatric/Behavioral: Negative.     Allergic/Immunologic: Negative.        Objective     Outpatient Medications:    Current Outpatient Medications:     amLODIPine (Norvasc) 5 mg tablet, TAKE ONE TABLET BY MOUTH ONCE DAILY., Disp: 90 tablet, Rfl: 0    cholecalciferol (Vitamin D-3) 125 MCG (5000 UT) capsule, Take 1 capsule (125 mcg) by mouth once daily., Disp: , Rfl:     DULoxetine (Cymbalta) 60 mg DR capsule, Take 1 capsule by mouth once daily., Disp: 90 capsule,  Rfl: 0    magnesium oxide 400 mg magnesium capsule, Take 1 capsule (400 mg) by mouth once daily., Disp: , Rfl:     omega 3-dha-epa-fish oil 1,200 (144-216) mg capsule, Take 1 capsule (1,200 mg) by mouth once daily., Disp: , Rfl:      Last Recorded Vitals  /63 (BP Location: Right arm, Patient Position: Sitting)   Pulse 70   Wt 50.3 kg (111 lb)   LMP  (LMP Unknown)   SpO2 98%   BMI 21.85 kg/m²     Physical Exam:  Physical Exam  HENT:      Head: Normocephalic and atraumatic.      Mouth/Throat:      Mouth: Mucous membranes are moist.   Eyes:      Extraocular Movements: Extraocular movements intact.      Conjunctiva/sclera: Conjunctivae normal.   Neck:      Vascular: No JVD.   Cardiovascular:      Rate and Rhythm: Normal rate and regular rhythm.      Pulses: Normal pulses.      Heart sounds: No murmur heard.  Pulmonary:      Effort: Pulmonary effort is normal.      Breath sounds: Normal breath sounds.   Abdominal:      General: There is no distension.   Musculoskeletal:      Right lower leg: No edema.      Left lower leg: No edema.   Skin:     General: Skin is warm and dry.   Neurological:      General: No focal deficit present.      Mental Status: She is alert.      Cranial Nerves: No cranial nerve deficit.      Motor: No weakness.   Psychiatric:         Mood and Affect: Mood normal.         Behavior: Behavior normal.         Lab Review:    Lab Results   Component Value Date    GLUCOSE 105 (H) 10/14/2024    CALCIUM 10.0 10/14/2024     10/14/2024    K 3.9 10/14/2024    CO2 29 10/14/2024     10/14/2024    BUN 17 10/14/2024    CREATININE 0.82 10/14/2024       Lab Results   Component Value Date    WBC 5.0 10/14/2024    HGB 13.0 10/14/2024    HCT 38.4 10/14/2024    MCV 94 10/14/2024     10/14/2024       Lab Results   Component Value Date    CHOL 170 10/14/2024    CHOL 169 07/27/2023    CHOL 130 10/03/2022     Lab Results   Component Value Date    HDL 62.4 10/14/2024    HDL 60.8 07/27/2023    HDL  69.5 10/03/2022     Lab Results   Component Value Date    LDLCALC 91 10/14/2024     Lab Results   Component Value Date    TRIG 82 10/14/2024    TRIG 69 07/27/2023    TRIG 80 10/03/2022       Lab Results   Component Value Date    TSH 3.16 10/14/2024       Assessment:   84 y.o.  female who presents for a follow-up for chest pain, hypertension. Past medical history of HTN, DLD, osteopenia, and arthritis.         Overall Plan:  1. Chest pain, dyspnea (resolved):  - Pharmacologic nuclear medicine stress test (11/2023) was low risk for ischemia; no further testing recommended at this time    2. HTN (goal BP < 130/90 mmHg; well-controlled)- continue amlodipine; if not at goal increase amlodipine to 10 mg daily    3. Dyslipidemia (goal LDL < 70 mg/dl)- continue dietary and lifestyle modifications    Disposition: Return to cardiology clinic as needed    Franc Stiles MD

## 2025-02-25 NOTE — PATIENT INSTRUCTIONS
Thank you for your visit today. Please contact our office (via MyChart or phone) with any additional questions.     [facility]    , RN/Clinic Nurse for:    Dr. Go Hernandez    [address], [address]  [place] [address]    Phone: [phone] Press Option 5 then Option 3 to speak with the Clinic Nurse ()    _____    To Reach:    Billing Questions -    [phone]  Scheduling / Rescheduling -  Option 1  Refills / Medication Requests -  Option 3  General Office / [place] -  Option 4  Results -     Option 6  Medical Records -    Option 7  Repeat Options -    Option 9

## 2025-03-01 LAB
ATRIAL RATE: 66 BPM
P AXIS: 59 DEGREES
P OFFSET: 206 MS
P ONSET: 164 MS
PR INTERVAL: 114 MS
Q ONSET: 221 MS
QRS COUNT: 11 BEATS
QRS DURATION: 74 MS
QT INTERVAL: 358 MS
QTC CALCULATION(BAZETT): 375 MS
QTC FREDERICIA: 369 MS
R AXIS: 60 DEGREES
T AXIS: 63 DEGREES
T OFFSET: 400 MS
VENTRICULAR RATE: 66 BPM

## 2025-03-04 DIAGNOSIS — I10 PRIMARY HYPERTENSION: ICD-10-CM

## 2025-03-04 RX ORDER — AMLODIPINE BESYLATE 5 MG/1
TABLET ORAL
Qty: 90 TABLET | Refills: 0 | Status: SHIPPED | OUTPATIENT
Start: 2025-03-04

## 2025-03-22 NOTE — TELEPHONE ENCOUNTER
Patient left a voicemail stating she tested positive for covid today, please schedule virtual visit.    Yes

## 2025-03-28 PROBLEM — I49.3 PVC'S (PREMATURE VENTRICULAR CONTRACTIONS): Status: ACTIVE | Noted: 2025-03-28

## 2025-05-09 DIAGNOSIS — G62.9 NEUROPATHY: ICD-10-CM

## 2025-05-12 RX ORDER — DULOXETIN HYDROCHLORIDE 60 MG/1
60 CAPSULE, DELAYED RELEASE ORAL DAILY
Qty: 90 CAPSULE | Refills: 0 | Status: SHIPPED | OUTPATIENT
Start: 2025-05-12

## 2025-07-08 ENCOUNTER — TELEPHONE (OUTPATIENT)
Dept: PRIMARY CARE | Facility: CLINIC | Age: 85
End: 2025-07-08
Payer: MEDICARE

## 2025-07-08 DIAGNOSIS — I10 PRIMARY HYPERTENSION: ICD-10-CM

## 2025-07-08 DIAGNOSIS — R73.02 IMPAIRED GLUCOSE TOLERANCE: ICD-10-CM

## 2025-07-08 DIAGNOSIS — E55.9 VITAMIN D DEFICIENCY: ICD-10-CM

## 2025-07-08 RX ORDER — AMLODIPINE BESYLATE 5 MG/1
5 TABLET ORAL DAILY
Qty: 90 TABLET | Refills: 0 | Status: SHIPPED | OUTPATIENT
Start: 2025-07-08

## 2025-07-29 ENCOUNTER — TELEPHONE (OUTPATIENT)
Dept: PRIMARY CARE | Facility: CLINIC | Age: 85
End: 2025-07-29
Payer: MEDICARE

## 2025-07-29 DIAGNOSIS — M81.0 OSTEOPOROSIS, UNSPECIFIED OSTEOPOROSIS TYPE, UNSPECIFIED PATHOLOGICAL FRACTURE PRESENCE: ICD-10-CM

## 2025-07-29 RX ORDER — ALENDRONATE SODIUM 70 MG/1
70 TABLET ORAL
Qty: 12 TABLET | Refills: 3 | Status: SHIPPED | OUTPATIENT
Start: 2025-07-29

## 2025-07-29 NOTE — TELEPHONE ENCOUNTER
Pt said she was supposed to be called in a prescription for foxmax? She had a osteoporosis diagnosis.

## 2025-08-08 DIAGNOSIS — I10 PRIMARY HYPERTENSION: ICD-10-CM

## 2025-08-08 RX ORDER — AMLODIPINE BESYLATE 5 MG/1
5 TABLET ORAL DAILY
Qty: 90 TABLET | Refills: 0 | Status: SHIPPED | OUTPATIENT
Start: 2025-08-08

## 2025-08-12 DIAGNOSIS — R79.89 ABNORMAL SERUM THYROID STIMULATING HORMONE (TSH) LEVEL: ICD-10-CM

## 2025-08-12 DIAGNOSIS — R94.6 ABNORMAL THYROID FUNCTION TEST: ICD-10-CM

## 2025-08-12 LAB
25(OH)D3+25(OH)D2 SERPL-MCNC: 60 NG/ML (ref 30–100)
ALBUMIN SERPL-MCNC: 4.8 G/DL (ref 3.6–5.1)
ALP SERPL-CCNC: 52 U/L (ref 37–153)
ALT SERPL-CCNC: 6 U/L (ref 6–29)
ANION GAP SERPL CALCULATED.4IONS-SCNC: 10 MMOL/L (CALC) (ref 7–17)
AST SERPL-CCNC: 13 U/L (ref 10–35)
BASOPHILS # BLD AUTO: 31 CELLS/UL (ref 0–200)
BASOPHILS NFR BLD AUTO: 0.5 %
BILIRUB SERPL-MCNC: 0.4 MG/DL (ref 0.2–1.2)
BUN SERPL-MCNC: 24 MG/DL (ref 7–25)
CALCIUM SERPL-MCNC: 9.6 MG/DL (ref 8.6–10.4)
CHLORIDE SERPL-SCNC: 106 MMOL/L (ref 98–110)
CHOLEST SERPL-MCNC: 182 MG/DL
CHOLEST/HDLC SERPL: 2.8 (CALC)
CO2 SERPL-SCNC: 25 MMOL/L (ref 20–32)
CREAT SERPL-MCNC: 0.9 MG/DL (ref 0.6–0.95)
EGFRCR SERPLBLD CKD-EPI 2021: 63 ML/MIN/1.73M2
EOSINOPHIL # BLD AUTO: 201 CELLS/UL (ref 15–500)
EOSINOPHIL NFR BLD AUTO: 3.3 %
ERYTHROCYTE [DISTWIDTH] IN BLOOD BY AUTOMATED COUNT: 13 % (ref 11–15)
EST. AVERAGE GLUCOSE BLD GHB EST-MCNC: 126 MG/DL
EST. AVERAGE GLUCOSE BLD GHB EST-SCNC: 7 MMOL/L
GLUCOSE SERPL-MCNC: 96 MG/DL (ref 65–99)
HBA1C MFR BLD: 6 %
HCT VFR BLD AUTO: 40.7 % (ref 35–45)
HDLC SERPL-MCNC: 64 MG/DL
HGB BLD-MCNC: 13.6 G/DL (ref 11.7–15.5)
LDLC SERPL CALC-MCNC: 97 MG/DL (CALC)
LYMPHOCYTES # BLD AUTO: 2025 CELLS/UL (ref 850–3900)
LYMPHOCYTES NFR BLD AUTO: 33.2 %
MCH RBC QN AUTO: 33 PG (ref 27–33)
MCHC RBC AUTO-ENTMCNC: 33.4 G/DL (ref 32–36)
MCV RBC AUTO: 98.8 FL (ref 80–100)
MONOCYTES # BLD AUTO: 604 CELLS/UL (ref 200–950)
MONOCYTES NFR BLD AUTO: 9.9 %
NEUTROPHILS # BLD AUTO: 3239 CELLS/UL (ref 1500–7800)
NEUTROPHILS NFR BLD AUTO: 53.1 %
NONHDLC SERPL-MCNC: 118 MG/DL (CALC)
PLATELET # BLD AUTO: 276 THOUSAND/UL (ref 140–400)
PMV BLD REES-ECKER: 9.6 FL (ref 7.5–12.5)
POTASSIUM SERPL-SCNC: 4.2 MMOL/L (ref 3.5–5.3)
PROT SERPL-MCNC: 7.2 G/DL (ref 6.1–8.1)
RBC # BLD AUTO: 4.12 MILLION/UL (ref 3.8–5.1)
SODIUM SERPL-SCNC: 141 MMOL/L (ref 135–146)
T4 FREE SERPL-MCNC: 1 NG/DL (ref 0.8–1.8)
TRIGL SERPL-MCNC: 114 MG/DL
TSH SERPL-ACNC: 6.58 MIU/L (ref 0.4–4.5)
WBC # BLD AUTO: 6.1 THOUSAND/UL (ref 3.8–10.8)

## 2025-09-03 ENCOUNTER — APPOINTMENT (OUTPATIENT)
Dept: PRIMARY CARE | Facility: CLINIC | Age: 85
End: 2025-09-03
Payer: MEDICARE

## 2025-09-03 PROBLEM — G45.9 TIA (TRANSIENT ISCHEMIC ATTACK): Status: RESOLVED | Noted: 2024-03-08 | Resolved: 2025-09-03

## 2025-09-03 PROBLEM — Z86.73 HISTORY OF TIA (TRANSIENT ISCHEMIC ATTACK): Status: ACTIVE | Noted: 2025-09-03

## 2025-09-03 PROBLEM — R09.89 CAROTID BRUIT: Status: RESOLVED | Noted: 2023-03-09 | Resolved: 2025-09-03

## 2025-09-03 ASSESSMENT — ENCOUNTER SYMPTOMS
CHEST TIGHTNESS: 0
FATIGUE: 0
DEPRESSION: 0
CHOKING: 0
DIZZINESS: 0
LOSS OF SENSATION IN FEET: 0
ACTIVITY CHANGE: 0
COUGH: 0
FACIAL ASYMMETRY: 0
APPETITE CHANGE: 0
OCCASIONAL FEELINGS OF UNSTEADINESS: 0
ARTHRALGIAS: 0
BACK PAIN: 0
PALPITATIONS: 0
FEVER: 0
HEADACHES: 0
LIGHT-HEADEDNESS: 0
COLOR CHANGE: 0

## 2025-09-03 ASSESSMENT — ACTIVITIES OF DAILY LIVING (ADL)
DOING_HOUSEWORK: INDEPENDENT
DRESSING: INDEPENDENT
BATHING: INDEPENDENT
TAKING_MEDICATION: INDEPENDENT
GROCERY_SHOPPING: INDEPENDENT
MANAGING_FINANCES: INDEPENDENT
